# Patient Record
Sex: MALE | Race: WHITE | Employment: FULL TIME | URBAN - METROPOLITAN AREA
[De-identification: names, ages, dates, MRNs, and addresses within clinical notes are randomized per-mention and may not be internally consistent; named-entity substitution may affect disease eponyms.]

---

## 2017-01-18 DIAGNOSIS — M47.899 FACET SYNDROME: ICD-10-CM

## 2017-01-18 DIAGNOSIS — M79.18 MYOFASCIAL PAIN: ICD-10-CM

## 2017-01-18 RX ORDER — DICLOFENAC SODIUM 75 MG/1
TABLET, DELAYED RELEASE ORAL
Qty: 60 TAB | Refills: 0 | Status: SHIPPED | OUTPATIENT
Start: 2017-01-18 | End: 2018-10-20

## 2017-01-26 DIAGNOSIS — M47.899 FACET SYNDROME: ICD-10-CM

## 2017-01-26 DIAGNOSIS — M79.18 MYOFASCIAL PAIN: ICD-10-CM

## 2017-01-27 RX ORDER — TRAMADOL HYDROCHLORIDE 50 MG/1
TABLET ORAL
Qty: 90 TAB | Refills: 0 | Status: SHIPPED | OUTPATIENT
Start: 2017-01-27

## 2017-03-23 ENCOUNTER — HOSPITAL ENCOUNTER (OUTPATIENT)
Dept: GENERAL RADIOLOGY | Age: 57
Discharge: HOME | End: 2017-03-23
Payer: COMMERCIAL

## 2017-03-23 DIAGNOSIS — M25.512 LEFT SHOULDER PAIN: ICD-10-CM

## 2017-03-23 PROCEDURE — 73030 X-RAY EXAM OF SHOULDER: CPT

## 2017-07-06 RX ORDER — TRAMADOL HYDROCHLORIDE 50 MG/1
TABLET ORAL
Qty: 90 TAB | Refills: 0 | OUTPATIENT
Start: 2017-07-06

## 2018-01-31 ENCOUNTER — HOSPITAL ENCOUNTER (OUTPATIENT)
Dept: GENERAL RADIOLOGY | Age: 58
Discharge: HOME | End: 2018-01-31
Payer: COMMERCIAL

## 2018-01-31 DIAGNOSIS — J20.9 ACUTE BRONCHITIS: ICD-10-CM

## 2018-01-31 PROCEDURE — 71046 X-RAY EXAM CHEST 2 VIEWS: CPT

## 2018-10-20 ENCOUNTER — HOSPITAL ENCOUNTER (EMERGENCY)
Age: 58
Discharge: HOME | End: 2018-10-20
Attending: EMERGENCY MEDICINE
Payer: COMMERCIAL

## 2018-10-20 VITALS
WEIGHT: 230 LBS | HEART RATE: 90 BPM | HEIGHT: 75 IN | TEMPERATURE: 98.7 F | DIASTOLIC BLOOD PRESSURE: 80 MMHG | OXYGEN SATURATION: 100 % | BODY MASS INDEX: 28.6 KG/M2 | RESPIRATION RATE: 18 BRPM | SYSTOLIC BLOOD PRESSURE: 130 MMHG

## 2018-10-20 DIAGNOSIS — E86.0 DEHYDRATION: ICD-10-CM

## 2018-10-20 DIAGNOSIS — R19.7 DIARRHEA, UNSPECIFIED TYPE: ICD-10-CM

## 2018-10-20 DIAGNOSIS — R51.9 ACUTE NONINTRACTABLE HEADACHE, UNSPECIFIED HEADACHE TYPE: Primary | ICD-10-CM

## 2018-10-20 PROCEDURE — 96376 TX/PRO/DX INJ SAME DRUG ADON: CPT

## 2018-10-20 PROCEDURE — 96361 HYDRATE IV INFUSION ADD-ON: CPT

## 2018-10-20 PROCEDURE — 96375 TX/PRO/DX INJ NEW DRUG ADDON: CPT

## 2018-10-20 PROCEDURE — 99282 EMERGENCY DEPT VISIT SF MDM: CPT

## 2018-10-20 PROCEDURE — 96374 THER/PROPH/DIAG INJ IV PUSH: CPT

## 2018-10-20 PROCEDURE — 74011250636 HC RX REV CODE- 250/636: Performed by: EMERGENCY MEDICINE

## 2018-10-20 RX ORDER — DIPHENHYDRAMINE HYDROCHLORIDE 50 MG/ML
25 INJECTION, SOLUTION INTRAMUSCULAR; INTRAVENOUS ONCE
Status: COMPLETED | OUTPATIENT
Start: 2018-10-20 | End: 2018-10-20

## 2018-10-20 RX ORDER — METOCLOPRAMIDE HYDROCHLORIDE 5 MG/ML
10 INJECTION INTRAMUSCULAR; INTRAVENOUS ONCE
Status: COMPLETED | OUTPATIENT
Start: 2018-10-20 | End: 2018-10-20

## 2018-10-20 RX ORDER — DIPHENHYDRAMINE HYDROCHLORIDE 50 MG/ML
50 INJECTION, SOLUTION INTRAMUSCULAR; INTRAVENOUS
Status: COMPLETED | OUTPATIENT
Start: 2018-10-20 | End: 2018-10-20

## 2018-10-20 RX ADMIN — METOCLOPRAMIDE 10 MG: 5 INJECTION, SOLUTION INTRAMUSCULAR; INTRAVENOUS at 20:25

## 2018-10-20 RX ADMIN — DIPHENHYDRAMINE HYDROCHLORIDE 50 MG: 50 INJECTION INTRAMUSCULAR; INTRAVENOUS at 20:54

## 2018-10-20 RX ADMIN — SODIUM CHLORIDE 1000 ML: 900 INJECTION, SOLUTION INTRAVENOUS at 20:39

## 2018-10-20 RX ADMIN — DIPHENHYDRAMINE HYDROCHLORIDE 25 MG: 50 INJECTION INTRAMUSCULAR; INTRAVENOUS at 20:39

## 2018-10-21 NOTE — ED PROVIDER NOTES
EMERGENCY DEPARTMENT HISTORY AND PHYSICAL EXAM 
 
8:24 PM 
 
 
Date: 10/20/2018 Patient Name: Isaac Ruiz History of Presenting Illness Chief Complaint Patient presents with  Hypertension  Headache  Diarrhea History Provided By: Patient and Patient's Wife Chief Complaint:  Diarrhea Duration:  Days Timing:  Gradual 
Location: na 
Quality: na 
Severity: Moderate Modifying Factors: worse after food Associated Symptoms: nausea, headache, feels warm and having chills Additional History (Context): Isaac Ruiz is a 62 y.o. male with diabetes, hypertension and hyperlipidemia who presents with diarrhea and ha. PCP: Ady Roldan MD 
 
Current Outpatient Medications Medication Sig Dispense Refill  amlodipine besylate (AMLODIPINE PO) Take  by mouth.  tamsulosin (FLOMAX) 0.4 mg capsule Take 1 Cap by mouth daily (after dinner). 90 Cap 3  
 levoFLOXacin (LEVAQUIN) 750 mg tablet Take 1 Tab by mouth daily. 1 Tab 0  
 baclofen (LIORESAL) 10 mg tablet Take  by mouth three (3) times daily.  tadalafil (CIALIS) 5 mg tablet Take 5 mg by mouth.  gabapentin (NEURONTIN) 300 mg capsule Take 300 mg by mouth three (3) times daily.  hydroCHLOROthiazide (HYDRODIURIL) 25 mg tablet Take 25 mg by mouth daily.  HYDROcodone-acetaminophen (NORCO) 5-325 mg per tablet Take  by mouth.  metFORMIN (GLUCOPHAGE) 500 mg tablet Take  by mouth two (2) times daily (with meals).  predniSONE (DELTASONE) 10 mg tablet Take  by mouth daily (with breakfast).  simvastatin (ZOCOR) 10 mg tablet Take  by mouth nightly.  traMADol (ULTRAM) 50 mg tablet Take 50 mg by mouth every six (6) hours as needed for Pain.  traMADol (ULTRAM) 50 mg tablet TAKE ONE TABLET BY MOUTH THREE TIMES A DAY AS NEEDED FOR PAIN 90 Tab 0  
 aspirin 81 mg chewable tablet Take 81 mg by mouth daily.  baclofen (LIORESAL) 10 mg tablet   0  
 gabapentin (NEURONTIN) 300 mg capsule   1  hydrochlorothiazide (HYDRODIURIL) 25 mg tablet   2  
 simvastatin (ZOCOR) 10 mg tablet   1  
 metFORMIN (GLUCOPHAGE) 500 mg tablet   1 Past History Past Medical History: 
Past Medical History:  
Diagnosis Date  Chronic low back pain  Degeneration of lumbar intervertebral disc  Diabetes ([facility] [address]  Diabetes mellitus ([facility] [address]  Essential hypertension  Hyperlipidemia  Hypertension  Shoulder pain Past Surgical History: 
Past Surgical History:  
Procedure Laterality Date  HX COLONOSCOPY    
 HX HERNIA REPAIR    
 HX OTHER SURGICAL    
 knee surgery Family History: 
Family History Problem Relation Age of Onset  Hypertension Mother  Diabetes Father  Heart Failure Father  Hypertension Father Social History: 
Social History Tobacco Use  Smoking status: Never Smoker  Smokeless tobacco: Never Used Substance Use Topics  Alcohol use: No  
  Alcohol/week: 0.0 oz  Drug use: No  
 
 
Allergies: Allergies Allergen Reactions  Lisinopril Cough Review of Systems Review of Systems Constitutional: Positive for appetite change and chills. Negative for fever. Respiratory: Negative for chest tightness and shortness of breath. Cardiovascular: Negative for chest pain. Gastrointestinal: Positive for diarrhea and nausea. Negative for blood in stool and vomiting. All other systems reviewed and are negative. Physical Exam  
 
Visit Vitals BP (!) 133/91 Pulse (!) 108 Temp 98.7 °F (37.1 °C) Resp 18 Ht 6' 3\" (1.905 m) Wt 104.3 kg (230 lb) SpO2 95% BMI 28.75 kg/m² Physical Exam  
Constitutional: He is oriented to person, place, and time. He appears well-developed and well-nourished. No distress. HENT:  
Head: Normocephalic and atraumatic. Right Ear: Hearing and external ear normal. No tenderness. Left Ear: Hearing and external ear normal. No tenderness. Nose: Nose normal. No rhinorrhea. Mouth/Throat: Oropharynx is clear and moist and mucous membranes are normal. No oropharyngeal exudate. Nose: No redness. Eyes: Conjunctivae and EOM are normal. Pupils are equal, round, and reactive to light. Right eye exhibits no discharge. Left eye exhibits no discharge. Neck: Normal range of motion. Neck supple. No tracheal deviation present. No thyromegaly present. Cardiovascular: Regular rhythm, S1 normal, S2 normal, normal heart sounds and intact distal pulses. Tachycardia present. Exam reveals no gallop and no friction rub. No murmur heard. Pulses 2+ throughout. Pulmonary/Chest: Breath sounds normal. No respiratory distress. He has no decreased breath sounds. He has no wheezes. He has no rales. He exhibits no tenderness. Chest: Atraumatic Abdominal: Soft. Bowel sounds are normal. He exhibits no distension. There is no tenderness. No peritoneal signs. Musculoskeletal: Normal range of motion. He exhibits no edema, tenderness or deformity. Back: Normal ROM. No CVA tenderness with palpation. Neurological: He is alert and oriented to person, place, and time. He has normal strength and normal reflexes. No cranial nerve deficit. Gait normal.  
Reflexes 2+ at brachioradialis and patella, bilaterally. Skin: Skin is warm, dry and intact. No rash noted. No erythema. No pallor. No wounds. Psychiatric: He has a normal mood and affect. His speech is normal and behavior is normal. Thought content normal. He expresses no homicidal and no suicidal ideation. Nursing note and vitals reviewed. Diagnostic Study Results Labs - No results found for this or any previous visit (from the past 12 hour(s)). Radiologic Studies - No orders to display Medical Decision Making I am the first provider for this patient. I reviewed the vital signs, available nursing notes, past medical history, past surgical history, family history and social history. Vital Signs-Reviewed the patient's vital signs. Pulse Oximetry Analysis -  95  on room air (Interpretation) normal 
 
 
 
Records Reviewed: Nursing Notes and Old Medical Records (Time of Review: 8:24 PM) Reviewed patients old med list.  
 
ED Course: Progress Notes, Reevaluation, and Consults: 
 
63 y/o male with diarrhea and nausea since Thursday (3 days now) Dry heaving once but no vomiting Decreased appetite and decreased PO liquid intake Normal measured temp at home but felt warm and then had chills Gradual onset of headache Thursday morning on his way to work. Frontal, aching-pounding. No radiation Afebrile, neck supply, no trauma 
 
nonfocal neuro exam 
abd s/nt D/w pt and his wife, low yield for labs or imaging. Abdomen is completely benign - doubt appie or other life threat Considered SAH/ menignitis / SDH other serious etiology for HA, I think all are unlikely. He looks very well, I think he likely has a viral illness, causing nausea and diarrhea (d/w pt and wife that there may be an underlying more serious diagnosis which is not yet apparent) He has mild dehdration. Will tx HA, do fluid resus  And re=evaluate,. 
 
9:41 PM 
   HA has resolved,  HR 75 on recheck Feels better, ready for home BP ranging 150 to 190 at home, but here, he is 133/91 Doubt hypertensive crisis with that level. Continue home anti-htn reginem and followup with pcm 
 
preacutiosn given for return Diagnosis Clinical Impression: 1. Acute nonintractable headache, unspecified headache type 2. Dehydration 3. Diarrhea, unspecified type Disposition: home Follow-up Information Follow up With Specialties Details Why Contact Info Cat De Leon MD Family Practice   [address] [facility] [address] 
[phone] [address] [facility] EMERGENCY DEPT Emergency Medicine  As needed, If symptoms worsen [place] [id number] [phone] Medication List  
  
ASK your doctor about these medications AMLODIPINE PO 
  
aspirin 81 mg chewable tablet * baclofen 10 mg tablet Commonly known as:  LIORESAL 
  
* baclofen 10 mg tablet Commonly known as:  LIORESAL 
  
CIALIS 5 mg tablet Generic drug:  tadalafil * gabapentin 300 mg capsule Commonly known as:  NEURONTIN 
  
* gabapentin 300 mg capsule Commonly known as:  NEURONTIN 
  
* hydroCHLOROthiazide 25 mg tablet Commonly known as:  HYDRODIURIL * hydroCHLOROthiazide 25 mg tablet Commonly known as:  HYDRODIURIL HYDROcodone-acetaminophen 5-325 mg per tablet Commonly known as:  NORCO 
  
levoFLOXacin 750 mg tablet Commonly known as:  Sallye Buzzard Take 1 Tab by mouth daily. * metFORMIN 500 mg tablet Commonly known as:  GLUCOPHAGE 
  
* metFORMIN 500 mg tablet Commonly known as:  GLUCOPHAGE 
  
predniSONE 10 mg tablet Commonly known as:  DELTASONE 
  
* simvastatin 10 mg tablet Commonly known as:  ZOCOR * simvastatin 10 mg tablet Commonly known as:  ZOCOR 
  
tamsulosin 0.4 mg capsule Commonly known as:  FLOMAX Take 1 Cap by mouth daily (after dinner). * traMADol 50 mg tablet Commonly known as:  ULTRAM 
  
* traMADol 50 mg tablet Commonly known as:  ULTRAM 
TAKE ONE TABLET BY MOUTH THREE TIMES A DAY AS NEEDED FOR PAIN * This list has 12 medication(s) that are the same as other medications prescribed for you. Read the directions carefully, and ask your doctor or other care provider to review them with you.  
  
  
  
 
_______________________________

## 2018-10-21 NOTE — DISCHARGE INSTRUCTIONS
Diarrhea: Care Instructions  Your Care Instructions    Diarrhea is loose, watery stools (bowel movements). The exact cause is often hard to find. Sometimes diarrhea is your body's way of getting rid of what caused an upset stomach. Viruses, food poisoning, and many medicines can cause diarrhea. Some people get diarrhea in response to emotional stress, anxiety, or certain foods. Almost everyone has diarrhea now and then. It usually isn't serious, and your stools will return to normal soon. The important thing to do is replace the fluids you have lost, so you can prevent dehydration. The doctor has checked you carefully, but problems can develop later. If you notice any problems or new symptoms, get medical treatment right away. Follow-up care is a key part of your treatment and safety. Be sure to make and go to all appointments, and call your doctor if you are having problems. It's also a good idea to know your test results and keep a list of the medicines you take. How can you care for yourself at home? · Watch for signs of dehydration, which means your body has lost too much water. Dehydration is a serious condition and should be treated right away. Signs of dehydration are:  ? Increasing thirst and dry eyes and mouth. ? Feeling faint or lightheaded. ? Darker urine, and a smaller amount of urine than normal.  · To prevent dehydration, drink plenty of fluids, enough so that your urine is light yellow or clear like water. Choose water and other caffeine-free clear liquids until you feel better. If you have kidney, heart, or liver disease and have to limit fluids, talk with your doctor before you increase the amount of fluids you drink. · Begin eating small amounts of mild foods the next day, if you feel like it. ? Try yogurt that has live cultures of Lactobacillus. (Check the label.)  ? Avoid spicy foods, fruits, alcohol, and caffeine until 48 hours after all symptoms are gone. ?  Avoid chewing gum that contains sorbitol. ? Avoid dairy products (except for yogurt with Lactobacillus) while you have diarrhea and for 3 days after symptoms are gone. · The doctor may recommend that you take over-the-counter medicine, such as loperamide (Imodium), if you still have diarrhea after 6 hours. Read and follow all instructions on the label. Do not use this medicine if you have bloody diarrhea, a high fever, or other signs of serious illness. Call your doctor if you think you are having a problem with your medicine. When should you call for help? Call 911 anytime you think you may need emergency care. For example, call if:    · You passed out (lost consciousness).     · Your stools are maroon or very bloody.    Call your doctor now or seek immediate medical care if:    · You are dizzy or lightheaded, or you feel like you may faint.     · Your stools are black and look like tar, or they have streaks of blood.     · You have new or worse belly pain.     · You have symptoms of dehydration, such as:  ? Dry eyes and a dry mouth. ? Passing only a little dark urine. ? Feeling thirstier than usual.     · You have a new or higher fever.    Watch closely for changes in your health, and be sure to contact your doctor if:    · Your diarrhea is getting worse.     · You see pus in the diarrhea.     · You are not getting better after 2 days (48 hours). Where can you learn more? Go to http://aguila-elana.info/. Enter L817 in the search box to learn more about \"Diarrhea: Care Instructions. \"  Current as of: November 20, 2017  Content Version: 11.8  © 2214-7286 [employer]. Care instructions adapted under license by [employer] (which disclaims liability or warranty for this information).  If you have questions about a medical condition or this instruction, always ask your healthcare professional. [place] [address] any warranty or liability for your use of this information.

## 2018-10-21 NOTE — ED NOTES
Tereso Carranza is a 62 y.o. male that was discharged in stable. Pt was accompanied by spouse. Pt is not driving. The patients diagnosis, condition and treatment were explained to  patient and aftercare instructions were given. The patient verbalized understanding. Patient armband removed and shredded.

## 2018-12-14 ENCOUNTER — HOSPITAL ENCOUNTER (OUTPATIENT)
Dept: GENERAL RADIOLOGY | Age: 58
Discharge: HOME | End: 2018-12-14
Payer: COMMERCIAL

## 2018-12-14 DIAGNOSIS — M25.561 PAIN IN RIGHT KNEE: ICD-10-CM

## 2018-12-14 PROCEDURE — 73564 X-RAY EXAM KNEE 4 OR MORE: CPT

## 2019-08-16 ENCOUNTER — HOSPITAL ENCOUNTER (OUTPATIENT)
Dept: GENERAL RADIOLOGY | Age: 59
Discharge: HOME | End: 2019-08-16
Payer: COMMERCIAL

## 2019-08-16 DIAGNOSIS — M54.2 CERVICALGIA: ICD-10-CM

## 2019-08-16 PROCEDURE — 72050 X-RAY EXAM NECK SPINE 4/5VWS: CPT

## 2019-08-30 ENCOUNTER — HOSPITAL ENCOUNTER (OUTPATIENT)
Dept: PHYSICAL THERAPY | Age: 59
Discharge: HOME | End: 2019-08-30
Payer: COMMERCIAL

## 2019-08-30 PROCEDURE — 97012 MECHANICAL TRACTION THERAPY: CPT

## 2019-08-30 PROCEDURE — 97161 PT EVAL LOW COMPLEX 20 MIN: CPT

## 2019-08-30 PROCEDURE — 97110 THERAPEUTIC EXERCISES: CPT

## 2019-08-30 NOTE — PROGRESS NOTES
[facility] [address]  [place], [address]  [phone] fax    Plan of Care/ Statement of Necessity for Physical Therapy Services    Patient name: Marcell Mallory Start of Care: 2019   Referral source: Libia Sun MD : 1960    Medical Diagnosis: Cervicalgia [M54.2]  Payor: BLUE CROSS / Plan: [address] [place] [address] / Product Type: PPO /  Onset Date:4 months    Treatment Diagnosis: c/s pain   Prior Hospitalization: see medical history Provider#: [id number]   Medications: Verified on Patient summary List    Comorbidities: diabetes, OA, HTN   Prior Level of Function: functionally I with daily tasks     The Plan of Care and following information is based on the information from the initial evaluation. Assessment/ liu information: 61 y/o male presents with c/o neck pain, primarily on the left and pain and numbness into the left shoulder. He also reports daily headaches. He reports no mechanism of injury. Pt demonstrates diminished posture with forward head and shoulders and increased t/s kyphosis. He demonstrates significantly limited c/s and t/s mobility and myofascial restrictions throughout the c/s musculature, including the suboccipitals. . Decreased strength noted mid and lower trap and shoulder external rotators. Significant TTP noted in the left UT, levator, c/s paraspinals, SCM, scalenes. Pt will benefit from PT to address the aforementioned impairments. Evaluation Complexity History MEDIUM  Complexity : 1-2 comorbidities / personal factors will impact the outcome/ POC ; Examination LOW Complexity : 1-2 Standardized tests and measures addressing body structure, function, activity limitation and / or participation in recreation  ;Presentation MEDIUM Complexity : Evolving with changing characteristics  ; Clinical Decision Making MEDIUM Complexity : FOTO score of 26-74  Overall Complexity Rating: LOW   Problem List: pain affecting function, decrease ROM, decrease strength, decrease ADL/ functional abilitiies, decrease activity tolerance and decrease flexibility/ joint mobility   Treatment Plan may include any combination of the following: Therapeutic exercise, Therapeutic activities, Neuromuscular re-education, Physical agent/modality, Manual therapy, Patient education and Self Care training  Patient / Family readiness to learn indicated by: asking questions, trying to perform skills and interest  Persons(s) to be included in education: patient (P)  Barriers to Learning/Limitations: None  Patient Goal (s): To help manage my pain to work  Patient Self Reported Health Status: good  Rehabilitation Potential: good    Short Term Goals: To be accomplished in 1 weeks:   1. Pt will be I and compliant with HEP     Long Term Goals: To be accomplished in 4 weeks:   1. Improve FOTO score to predicted outcome to improve ability for functional tasks   2. Improve c/s AROM rotation to 40 degrees to improve ability for driving and other daily activities. 3. Pt will report at least 50% reduction in headaches to improve ability for job activitese   4. Pt will demonstrate 4+/5 mid trap strength to improve ability for lifting at work. Frequency / Duration: Patient to be seen 2 times per week for 4-6 weeks. Patient/ Caregiver education and instruction: Diagnosis, prognosis, self care, activity modification and exercises   [x]  Plan of care has been reviewed with LILIYA Lazaro, PT 8/30/2019 9:29 AM    ________________________________________________________________________    I certify that the above Therapy Services are being furnished while the patient is under my care. I agree with the treatment plan and certify that this therapy is necessary.     [de-identified] Signature:____________Date:_________TIME:________    [employer], Date and Time must be completed for valid certification **    Please sign and return to [facility]  [address]  [place], [address]  [phone] fax

## 2019-08-30 NOTE — PROGRESS NOTES
Request for use of Dry Needling/Intramuscular Manual Therapy  Patient: Freddie Mckeon     Referral Source: Raul Wade MD  Diagnosis: Cervicalgia [M54.2]      : 1960  Date of initial visit: 19   Attended visits: 1  Missed Visits: 0    Based on findings from the physical therapy examination and evaluation, the evaluating therapist believes the patient, Freddie Mckeon  would benefit from including Dry Needling as part of the plan of care. Dry needling is a treatment technique utilized in conjunction with other PT interventions to inactivate myofascial trigger points and the pain and dysfunction they cause. Dry Needling is an advanced procedure that requires additional training including greater than 54 hours of intensive course work. Physical Therapists at [address] [place] [address] are trained and/or certified through [employer] for their education. PROCEDURE:   Solid filament sterile needle (typically 0.3mm/30 gauge) inserted into a trigger point   Repeated movements inactivate the trigger points, taking 30-60 seconds per site   Typically consists of 1 dry needling session per week and a possible second treatment including muscle re-education, flexibility, strengthening and other manual techniques to facilitate the benefits of dry needling     BENEFITS:   Inactivation of trigger points   Decreased pain   Increased muscle length   Improved movement patterns   Restoration of function POTENTIAL RISKS:   Post-needling soreness   Infection   Bruising/bleeding   Penetration of a nerve   Pneumothorax   All treating PTs have been thoroughly educated in avoiding adverse reactions    If you agree with this recommendation, please sign this form and fax it to us at [phone]. If you have questions or concerns regarding dry needling or any other treatment we may be providing, please contact us at [phone].     Thank you for allowing us to assist in the care of your patient. Demetria Piña, PT    8/30/2019 10:35 AM     NOTE TO PHYSICIAN:  PLEASE COMPLETE THE ORDERS BELOW AND   FAX TO [facility]: [phone]  If you are unable to process this request in 24 hours please contact our office:   [phone]    I have read the above request and AGREE to the recommendation of including dry needling as part of the plan of care.       Physicians signature: _________________________Date: _________Time:________

## 2019-08-30 NOTE — PROGRESS NOTES
PT DAILY TREATMENT NOTE 10-18    Patient Name: Ole Ling  Date:2019  : 1960  [x]  Patient  Verified  Payor: Serina Chung / Plan: [address] [place] [address] / Product Type: PPO /    In time:930  Out time:  Total Treatment Time (min): 45  Visit #: 1 of 8    Medicare/BCBS Only   Total Timed Codes (min):  10 1:1 Treatment Time:  45       Treatment Area: Cervicalgia [M54.2]    SUBJECTIVE  Pain Level (0-10 scale): 8  Any medication changes, allergies to medications, adverse drug reactions, diagnosis change, or new procedure performed?: [x] No    [] Yes (see summary sheet for update)  Subjective functional status/changes:   [] No changes reported       OBJECTIVE    Modality rationale: decrease pain and increase tissue extensibility to improve the patients ability to perform ADL   Min Type Additional Details    [] Estim:  []Unatt       []IFC  []Premod                        []Other:  []w/ice   []w/heat  Position:  Location:    [] Estim: []Att    []TENS instruct  []NMES                    []Other:  []w/US   []w/ice   []w/heat  Position:  Location:   10 [x]  Traction: [x] Cervical       []Lumbar                       [] Prone          [x]Supine                       []Intermittent   [x]Continuous Lbs: 23  [] before manual  [] after manual    []  Ultrasound: []Continuous   [] Pulsed                           []1MHz   []3MHz W/cm2:  Location:    []  Iontophoresis with dexamethasone         Location: [] Take home patch   [] In clinic    []  Ice     []  heat  []  Ice massage  []  Laser   []  Anodyne Position:  Location:    []  Laser with stim  []  Other:  Position:  Location:    []  Vasopneumatic Device Pressure:       [] lo [] med [] hi   Temperature: [] lo [] med [] hi   [] Skin assessment post-treatment:  []intact []redness- no adverse reaction    []redness - adverse reaction:     27 min [x]Eval                  []Re-Eval       8 min Therapeutic Exercise:  [] See flow sheet : HEP   Rationale: increase ROM and increase strength to improve the patients ability to perform ADL            With   [] TE   [] TA   [] neuro   [] other: Patient Education: [x] Review HEP    [] Progressed/Changed HEP based on:   [] positioning   [] body mechanics   [] transfers   [] heat/ice application    [] other:      Other Objective/Functional Measures:       Pain Level (0-10 scale) post treatment: 8    ASSESSMENT/Changes in Function:      Patient will continue to benefit from skilled PT services to modify and progress therapeutic interventions, address functional mobility deficits, address ROM deficits, address strength deficits, analyze and address soft tissue restrictions, analyze and cue movement patterns, analyze and modify body mechanics/ergonomics and assess and modify postural abnormalities to attain remaining goals.      []  See Plan of Care  []  See progress note/recertification  []  See Discharge Summary         Progress towards goals / Updated goals:  Per [address]  []  Upgrade activities as tolerated     [x]  Continue plan of care  []  Update interventions per flow sheet       []  Discharge due to:_  []  Other:_      Saurabh Jordan PT 8/30/2019  9:25 AM    Future Appointments   Date Time Provider Sathya Platt   8/30/2019  9:30 AM Jaswant Laureano, PT [id number]

## 2019-09-12 ENCOUNTER — HOSPITAL ENCOUNTER (OUTPATIENT)
Dept: PHYSICAL THERAPY | Age: 59
Discharge: HOME | End: 2019-09-12
Payer: COMMERCIAL

## 2019-09-12 PROCEDURE — 97012 MECHANICAL TRACTION THERAPY: CPT

## 2019-09-12 PROCEDURE — 97110 THERAPEUTIC EXERCISES: CPT

## 2019-09-12 PROCEDURE — 97140 MANUAL THERAPY 1/> REGIONS: CPT

## 2019-09-12 NOTE — PROGRESS NOTES
PT DAILY TREATMENT NOTE 10-18    Patient Name: Robi Ziegler  Date:2019  : 1960  [x]  Patient  Verified  Payor: BLUE CROSS / Plan: [address] [place] [address] / Product Type: PPO /    In time:731  Out time:816  Total Treatment Time (min): 45  Visit #: 2 of 8    Medicare/BCBS Only   Total Timed Codes (min):  45 1:1 Treatment Time:  40       Treatment Area: Cervicalgia [M54.2]    SUBJECTIVE  Pain Level (0-10 scale): 6  Any medication changes, allergies to medications, adverse drug reactions, diagnosis change, or new procedure performed?: [x] No    [] Yes (see summary sheet for update)  Subjective functional status/changes:   [] No changes reported  Patient reported decreased     OBJECTIVE    Modality rationale: decrease pain and increase tissue extensibility to improve the patients ability to perform ADLs   Min Type Additional Details    [] Estim:  []Unatt       []IFC  []Premod                        []Other:  []w/ice   []w/heat  Position:  Location:    [] Estim: []Att    []TENS instruct  []NMES                    []Other:  []w/US   []w/ice   []w/heat  Position:  Location:   10 [x]  Traction: [x] Cervical       []Lumbar                       [] Prone          [x]Supine                       []Intermittent   []Continuous Lbs:23  [] before manual  [x] after manual    []  Ultrasound: []Continuous   [] Pulsed                           []1MHz   []3MHz W/cm2:  Location:    []  Iontophoresis with dexamethasone         Location: [] Take home patch   [] In clinic    []  Ice     []  heat  []  Ice massage  []  Laser   []  Anodyne Position:  Location:    []  Laser with stim  []  Other:  Position:  Location:    []  Vasopneumatic Device Pressure:       [] lo [] med [] hi   Temperature: [] lo [] med [] hi   [] Skin assessment post-treatment:  []intact []redness- no adverse reaction    []redness - adverse reaction:     27 min Therapeutic Exercise:  [] See flow sheet :   Rationale: increase ROM, increase strength and improve coordination to improve the patients ability to perform ADLs             8 min Manual Therapy:  Per flow sheet   Rationale: decrease pain, increase ROM and increase tissue extensibility to peform ADLs              With   [] TE   [] TA   [] neuro   [] other: Patient Education: [x] Review HEP    [] Progressed/Changed HEP based on:   [] positioning   [] body mechanics   [] transfers   [] heat/ice application    [] other:      Other Objective/Functional Measures: initiated treatment     Pain Level (0-10 scale) post treatment: 5    ASSESSMENT/Changes in Function: patient tolerated initiation of treatment per flow sheet an dPOC well without c/o increased pain. Patient will continue to benefit from skilled PT services to modify and progress therapeutic interventions, address functional mobility deficits, address ROM deficits, address strength deficits, analyze and address soft tissue restrictions and analyze and cue movement patterns to attain remaining goals. [x]  See Plan of Care  []  See progress note/recertification  []  See Discharge Summary         Progress towards goals / Updated goals:  Short Term Goals: To be accomplished in 1 weeks:               1. Pt will be I and compliant with HEP met patient reports compliance (9/12/19)     Long Term Goals: To be accomplished in 4 weeks:               1. Improve FOTO score to predicted outcome to improve ability for functional tasks               2. Improve c/s AROM rotation to 40 degrees to improve ability for driving and other daily activities. 3. Pt will report at least 50% reduction in headaches to improve ability for job activitese               4. Pt will demonstrate 4+/5 mid trap strength to improve ability for lifting at work.      PLAN  []  Upgrade activities as tolerated     [x]  Continue plan of care  []  Update interventions per flow sheet       []  Discharge due to:_  []  Other:_      Tiana Nicole, PTA 9/12/2019  7:53 AM    Future Appointments   Date Time Provider Sathya Giulia   9/13/2019 10:30 AM Priyank Wallace, PT [facility]   9/20/2019 10:00 AM Shahram Ozuna PTA [facility]

## 2019-09-13 ENCOUNTER — HOSPITAL ENCOUNTER (OUTPATIENT)
Dept: PHYSICAL THERAPY | Age: 59
Discharge: HOME | End: 2019-09-13
Payer: COMMERCIAL

## 2019-09-13 PROCEDURE — 97112 NEUROMUSCULAR REEDUCATION: CPT

## 2019-09-13 PROCEDURE — 97110 THERAPEUTIC EXERCISES: CPT

## 2019-09-13 NOTE — PROGRESS NOTES
PT DAILY TREATMENT NOTE 10-18    Patient Name: Chance Garcia  Date:2019  : 1960  [x]  Patient  Verified  Payor: [employer] / Plan: [address] [place] [address] / Product Type: PPO /    In time:1030  Out time:1118  Total Treatment Time (min): 48  Visit #: 3 of 8    Medicare/BCBS Only   Total Timed Codes (min):  38 1:1 Treatment Time:  38       Treatment Area: Cervicalgia [M54.2]    SUBJECTIVE  Pain Level (0-10 scale): 7 with meds  Any medication changes, allergies to medications, adverse drug reactions, diagnosis change, or new procedure performed?: [x] No    [] Yes (see summary sheet for update)  Subjective functional status/changes:   [x] No changes reported      OBJECTIVE    Modality rationale: decrease pain to improve the patients ability to tolerate post needle soreness   Min Type Additional Details    [] Estim:  []Unatt       []IFC  []Premod                        []Other:  []w/ice   []w/heat  Position:  Location:    [] Estim: []Att    []TENS instruct  []NMES                    []Other:  []w/US   []w/ice   []w/heat  Position:  Location:    []  Traction: [] Cervical       []Lumbar                       [] Prone          []Supine                       []Intermittent   []Continuous Lbs:  [] before manual  [] after manual    []  Ultrasound: []Continuous   [] Pulsed                           []1MHz   []3MHz W/cm2:  Location:    []  Iontophoresis with dexamethasone         Location: [] Take home patch   [] In clinic   10 [x]  Ice     []  heat  []  Ice massage  []  Laser   []  Anodyne Position:  Location:    []  Laser with stim  []  Other:  Position:  Location:    []  Vasopneumatic Device Pressure:       [] lo [] med [] hi   Temperature: [] lo [] med [] hi   [] Skin assessment post-treatment:  []intact []redness- no adverse reaction      23 min Therapeutic Exercise:  [] See flow sheet :   Rationale: increase ROM and increase strength to improve the patients ability to perform daily activities     15 min Neuromuscular Re-education:  []  See flow sheet :   Rationale: increase ROM and increase strength  to improve the patients ability to perform work tasks  Dry Needling Procedure Note    Procedure: An intramuscular manual therapy (dry needling) and a neuro-muscular re-education treatment was done to deactivate myofascial trigger points with a 30 gauge filament needle under aseptic technique. Indications:  [x] Myofascial pain and dysfunction [] Muscled spasms  [x] Myalgia/myositis   [] Muscle cramps  [x] Muscle imbalances  [] Temporomandibular Dysfunction  [] Other:    Chart reviewed for the following:  Jason MENJIVAR PT, have reviewed the medical history, summary list and precautions/contraindications for [employer].   TIME OUT performed immediately prior to start of procedure:  Jason MENJIVAR PT, have performed the following reviews on [employer] prior to the start of the session:      [x] Verified patient identification by name and date of birth    [x] Agreement on all muscles being treated was verified   [x] Purpose of dry needling, side effects, possible complications, risks and benefits were explained to the patient   [x] Procedure site(s) verified  [x] Patient was positioned for comfort and draped for privacy  [x] Informed Consent was signed (initial visit) and verified verbally (subsequent visits)  [x] Patient was instructed on the need to report the use of blood thinners and/or immunosuppressant medications  [x] How to respond to possible adverse effects of treatment  [x] Self treatment of post needling soreness: ice, heat (moist heat, heat wraps) and stretching  [x] Opportunity was given to ask any questions, all questions were answered            Time: 935  Date of procedure: 9/13/2019  Treatment: The following muscles were treated today with intramuscular dry needling  [] Left [] Right Masster  [] Left [] Right Temporalis  [] Left [] Right Zygomaticus Major / Minor  [] Left [] Right Lateral Pterygoid  [] Left [] Right Medial Pterygoid  [] Left [] Right Digastric Post / Anterior Belly  [] Left [] Right Sternocleidomastoid  [] Left [] Right Scalene Anterior / Medial / Posterior  [] Left [] Right Extra Laryngeal Muscles  [x] Left [] Right Upper Trapezius  [] Left [] Right Middle Trapezius  [] Left [] Right Lower Trapezius  [] Left [] Right Oblique Capitis Inferior  [x] Left [] Right Splenius Capitis / Cervicis  [x] Left [] Right Semispinalis: Capitis / Cervicis  [x] Left [] Right Multifidi / Rotatores Cervicis / Thoracic  [] Left [] Right Longissimus Thoracis / Illiocostalis  [] Left [] Right Levator Scapulae  [] Left [] Right Supraspinatus / Infraspinatus  [] Left [] Right Teres Major / Minor  [] Left [] Right Rhomboids / Serratus posterior superior  [] Left [] Right Pectoralis Major / Minor  [] Left [] Right Serratus Anterior  [] Left [] Right Latissimus Dorsi  [] Left [] Right Subscapularis  [] Left [] Right Coracobrachialis  [] Left [] Right Biceps Brachii  [] Left [] Right Deltoid: Anterior / Medial / Posterior  [] Left [] Right Brachialis  [] Left [] Right Triceps  [] Left [] Right Brachioradialis  [] Left [] Right Extensor Carpi Radialis Brevis / Extensor Carpi Radialis Longus    [] Left [] Right  Extensor digitorum  [] Left [] Right Supinator / Pronator Teres  [] Left [] Right Flexor Carpi Radialis/ Flexor Carpi Ulnaris   [] Left [] Right  Flexor Digitorum Superficialis/ Flexor Digitorum Profundus  [] Left [] Right Flexor Pollicis Longus / Flexor Pollicis Brevis / Palmaris Longus  [] Left [] Right Abductor Pollicis Longus / Abductor Pollicis Brevis  [] Left [] Right Opponens Pollicis / Adductor Pollicis  [] Left [] Right Dorsal / Palmar Interossei / Lumbricalis  [] Left [] Right Abductor Digiti Minimi / Opponens Digiti Minimi    Patient's response to today's treatment:  [x] Latent Twitch Response     [x] Muscle relaxation [x] Pain Relief  [x] Post needling soreness    [x] without complications  [x] Increased Range of Motion   [] Decreased headaches    [] Decreased Tinnitus  [] Other:     Performed by: Neeru Espinal PT            With   [] TE   [] TA   [] neuro   [] other: Patient Education: [x] Review HEP    [] Progressed/Changed HEP based on:   [] positioning   [] body mechanics   [] transfers   [] heat/ice application    [] other:      Other Objective/Functional Measures: initiated DN     Pain Level (0-10 scale) post treatment: 8    ASSESSMENT/Changes in Function: Good tolerance to DN; poor left rotation noted in both C/S and T/S. Patient will continue to benefit from skilled PT services to modify and progress therapeutic interventions, address functional mobility deficits, address ROM deficits, address strength deficits, analyze and address soft tissue restrictions, analyze and cue movement patterns and assess and modify postural abnormalities to attain remaining goals. []  See Plan of Care  []  See progress note/recertification  []  See Discharge Summary         Progress towards goals / Updated goals:  Short Term Goals: To be accomplished in 1 weeks:               1. Pt will be I and compliant with HEP met patient reports compliance (9/12/19)     Long Term Goals: To be accomplished in 4 weeks:               1. Improve FOTO score to predicted outcome to improve ability for functional tasks               2. Improve c/s AROM rotation to 40 degrees to improve ability for driving and other daily activities.              3. Pt will report at least 50% reduction in headaches to improve ability for job activitese               4. Pt will demonstrate 4+/5 mid trap strength to improve ability for lifting at work.      PLAN  []  Upgrade activities as tolerated     []  Continue plan of care  []  Update interventions per flow sheet       []  Discharge due to:_  []  Other:_      Neeru Espinal, PT 9/13/2019  10:38 AM    Future Appointments   Date Time Provider Sathya Platt   9/20/2019 10:00 AM Mu Jenkins PTA [facility]

## 2019-09-20 ENCOUNTER — HOSPITAL ENCOUNTER (OUTPATIENT)
Dept: PHYSICAL THERAPY | Age: 59
Discharge: HOME | End: 2019-09-20
Payer: COMMERCIAL

## 2019-09-20 PROCEDURE — 97110 THERAPEUTIC EXERCISES: CPT

## 2019-09-20 PROCEDURE — 97140 MANUAL THERAPY 1/> REGIONS: CPT

## 2019-09-20 NOTE — PROGRESS NOTES
PT DAILY TREATMENT NOTE 10-18    Patient Name: Geoff Rodriguez  Date:2019  : 1960  [x]  Patient  Verified  Payor: Derik Catalan / Plan: [address] [place] [address] / Product Type: PPO /    In time:10:00  Out time:10:43  Total Treatment Time (min): 43  Visit #: 4 of 8    Medicare/BCBS Only   Total Timed Codes (min):  43 1:1 Treatment Time:  28       Treatment Area: Cervicalgia [M54.2]    SUBJECTIVE  Pain Level (0-10 scale): 8/10  Any medication changes, allergies to medications, adverse drug reactions, diagnosis change, or new procedure performed?: [x] No    [] Yes (see summary sheet for update)  Subjective functional status/changes:   [] No changes reported  \"I was dry-needled last visit, it's been sore since. \"     OBJECTIVE    35 min Therapeutic Exercise:  [x] See flow sheet :   Rationale: increase ROM, increase strength and increase proprioception to improve the patients ability to perform ADL's.    8 min Manual Therapy:  SOR. STM/DTM Left UT, lev scap, C/S paraspinals. C/S PROM Rotation. Rationale: decrease pain, increase ROM, increase tissue extensibility and decrease trigger points to improve ease of performing ADL's. With   [x] TE   [] TA   [] neuro   [] other: Patient Education: [x] Review HEP    [] Progressed/Changed HEP based on:   [] positioning   [] body mechanics   [] transfers   [] heat/ice application    [] other:      Other Objective/Functional Measures: Intake FOTO 51%. AROM C/S Rotation Left 29 degrees and Right 52 degrees. No change in HA's at this time per pt. Pt reports \"flush\" feeling when receiving SOR, quickly diminishes after releasing SOR hold. No significant change in symptoms at this time. Recommend F/U with MD and continue PT to decrease mm restrictions and nerve impingement to improve ease of seeing blind spots while driving.     Pain Level (0-10 scale) post treatment: 8/10    ASSESSMENT/Changes in Function:     []  See Plan of Care  [x]  See progress note/recertification  []  See Discharge Summary         Progress towards goals / Updated goals:  Short Term Goals: To be accomplished in 1 weeks:               1. Pt will be I and compliant with HEP met patient reports compliance (9/12/19)     Long Term Goals: To be accomplished in 4 weeks:               1. Improve FOTO score to predicted outcome to improve ability for functional tasks - Finished intake survey. 9/20/2019               2. Improve c/s AROM rotation to 40 degrees to improve ability for driving and other daily activities. - AROM C/S Rotation Left 29 degrees and Right 52 degrees. 9/20/2019               3. Pt will report at least 50% reduction in headaches to improve ability for job activitese - No change in HA's at this time per pt. 9/20/2019               4. Pt will demonstrate 4+/5 mid trap strength to improve ability for lifting at work. - Was not reassessed. 9/20/2019    PLAN  []  Upgrade activities as tolerated     [x]  Continue plan of care  []  Update interventions per flow sheet       []  Discharge due to:_  [x]  Other:_   Recommend F/U with MD, no significant change in symptoms.     Wei Rubin PTA 9/20/2019  9:59 AM    Future Appointments   Date Time Provider Sathya Platt   9/20/2019 10:00 AM Shahram Ozuna PTA [facility]

## 2019-09-20 NOTE — PROGRESS NOTES
[facility]  [address]  [place], [address]  [phone] fax    Physical Therapy Progress Note  Patient name: Amadou Moreland Start of Care: 2019   Referral source: Jero Richard MD : 1960   Medical/Treatment Diagnosis: Cervicalgia [M54.2]  Payor: BLUE CROSS / Plan: [address] [place] [address] / Product Type: PPO /  Onset Date:4 Months     Prior Hospitalization: see medical history Provider#: [id number]   Medications: Verified on Patient Summary List    Comorbidities: diabetes, OA, HTN  Prior Level of Function:functionally I with daily tasks  Visits from Start of Care: 4    Missed Visits: 0      Key Functional Changes: Intake FOTO 51%. AROM C/S Rotation Left 29 degrees and Right 52 degrees. No change in HA's at this time per pt. Pt reports \"flush\" feeling when receiving SOR, quickly diminishes after releasing SOR hold. Short Term Goals: To be accomplished in 1 weeks:               1. Pt will be I and compliant with HEP met patient reports compliance (19)     Long Term Goals: To be accomplished in 4 weeks:               1. Improve FOTO score to predicted outcome to improve ability for functional tasks - Finished intake survey. 2019               2. Improve c/s AROM rotation to 40 degrees to improve ability for driving and other daily activities. - AROM C/S Rotation Left 29 degrees and Right 52 degrees. 2019               3. Pt will report at least 50% reduction in headaches to improve ability for job activitese - No change in HA's at this time per pt. 2019               4. Pt will demonstrate 4+/5 mid trap strength to improve ability for lifting at work. - Was not reassessed. 2019        Updated Goals: to be achieved in 4 weeks:   [address] be accomplished in 4 weeks:               1. Improve FOTO score to predicted outcome to improve ability for functional tasks - Finished intake survey. 9/20/2019               2. Improve c/s AROM rotation to 40 degrees to improve ability for driving and other daily activities. - AROM C/S Rotation Left 29 degrees and Right 52 degrees. 9/20/2019               3. Pt will report at least 50% reduction in headaches to improve ability for job activitese - No change in HA's at this time per pt. 9/20/2019               4. Pt will demonstrate 4+/5 mid trap strength to improve ability for lifting at work. - Was not reassessed. 9/20/2019     No significant change in symptoms at this time. Recommend F/U with MD and continue PT to decrease mm restrictions and nerve impingement to improve ease of seeing blind spots while driving. ASSESSMENT/RECOMMENDATIONS:  [x]Continue therapy per initial plan/protocol at a frequency of  2 x per week for 4 weeks  []Continue therapy with the following recommended changes:_____________________      _____________________________________________________________________  []Discontinue therapy progressing towards or have reached established goals  []Discontinue therapy due to lack of appreciable progress towards goals  []Discontinue therapy due to lack of attendance or compliance  []Await Physician's recommendations/decisions regarding therapy  [x]Other:_Instructed pt to schedule F/U visit with MD._    Thank you for this referral.    Aime Campos, LILIYA 9/20/2019 12:43 PM  NOTE TO PHYSICIAN:  [address]   FAX TO [facility]: [phone]  If you are unable to process this request in 24 hours please contact our office: [phone] I have read the above report and request that my patient continue as recommended. ? I have read the above report and request that my patient continue therapy with the following changes/special instructions:__________________________________________________________  ? I have read the above report and request that my patient be discharged from therapy.     Roldan Roper signature: ______________________________Date: ______Time:______

## 2019-10-10 ENCOUNTER — HOSPITAL ENCOUNTER (OUTPATIENT)
Dept: PHYSICAL THERAPY | Age: 59
Discharge: HOME | End: 2019-10-10
Payer: COMMERCIAL

## 2019-10-10 PROCEDURE — 97140 MANUAL THERAPY 1/> REGIONS: CPT

## 2019-10-10 PROCEDURE — 97110 THERAPEUTIC EXERCISES: CPT

## 2019-10-10 NOTE — PROGRESS NOTES
PT DAILY TREATMENT NOTE 10-18    Patient Name: Beatris Romo  Date:10/10/2019  : 1960  [x]  Patient  Verified  Payor: Mon[employer]ica Fonseca / Plan: [address] [place] [address] / Product Type: PPO /    In time:8:30  Out time:9:08  Total Treatment Time (min): 38  Visit #: 1 of 8    Medicare/BCBS Only   Total Timed Codes (min):  38 1:1 Treatment Time:  30       Treatment Area: Cervicalgia [M54.2]    SUBJECTIVE  Pain Level (0-10 scale): 6  Any medication changes, allergies to medications, adverse drug reactions, diagnosis change, or new procedure performed?: [x] No    [] Yes (see summary sheet for update)  Subjective functional status/changes:   [] No changes reported  Pt reports no change since his last visit here, reports symptoms are at same intensity/frequency no worse or better. He reports he is having MRI tomorrow    OBJECTIVE    28 min Therapeutic Exercise:  [] See flow sheet :   Rationale: increase ROM and increase strength to improve the patients ability to perform daily tasks and work duties      10 min Manual Therapy:  SOR, cervical segmental mobility assessment and mobs in supine, t/s PA's and rib springs grade II-III in prone   Rationale: decrease pain, increase ROM and increase tissue extensibility to improve ease of ADL performance       With   [] TE   [] TA   [] neuro   [] other: Patient Education: [x] Review HEP    [] Progressed/Changed HEP based on:   [] positioning   [] body mechanics   [] transfers   [] heat/ice application    [] other:      Other Objective/Functional Measures: Pt still demonstrating increased left sided cervical tone and TTP with limited mobility      Pain Level (0-10 scale) post treatment: 6    ASSESSMENT/Changes in Function: Pt still reporting daily headaches and intermittent paresthesias through proximal left shoulder. Cervical stability is still quite limited. Pt is scheduled for MRI tomorrow. Will continue PT focusing on improved mobility and pain as able.     Patient will continue to benefit from skilled PT services to modify and progress therapeutic interventions, address functional mobility deficits, address ROM deficits, address strength deficits, analyze and address soft tissue restrictions, analyze and cue movement patterns and analyze and modify body mechanics/ergonomics to attain remaining goals. []  See Plan of Care  []  See progress note/recertification  []  See Discharge Summary         Progress towards goals / Updated goals:  Updated Goals: to be achieved in 4 weeks:              Long Term Goals: To be accomplished in 4 weeks:               1. Improve FOTO score to predicted outcome to improve ability for functional tasks - Finished intake survey. 9/20/2019               2. Improve c/s AROM rotation to 40 degrees to improve ability for driving and other daily activities. - AROM C/S Rotation Left 29 degrees and Right 52 degrees.  9/20/2019 slow progress- 33 deg left 52 deg right 10/10/19               3. Pt will report at least 50% reduction in headaches to improve ability for job activitese - No change in HA's at this time per pt. 9/20/2019; no change in headaches per pt report 10/10/19               4. Pt will demonstrate 4+/5 mid trap strength to improve ability for lifting at work. - Was not reassessed. 9/20/2019    PLAN  []  Upgrade activities as tolerated     [x]  Continue plan of care  []  Update interventions per flow sheet       []  Discharge due to:_  []  Other:_      May Hargrove DPT CMTPT 10/10/2019  8:39 AM    No future appointments.

## 2019-10-24 ENCOUNTER — HOSPITAL ENCOUNTER (OUTPATIENT)
Dept: PHYSICAL THERAPY | Age: 59
End: 2019-10-24
Payer: COMMERCIAL

## 2019-10-25 ENCOUNTER — APPOINTMENT (OUTPATIENT)
Dept: PHYSICAL THERAPY | Age: 59
End: 2019-10-25
Payer: COMMERCIAL

## 2019-10-31 ENCOUNTER — APPOINTMENT (OUTPATIENT)
Dept: PHYSICAL THERAPY | Age: 59
End: 2019-10-31
Payer: COMMERCIAL

## 2019-11-01 ENCOUNTER — APPOINTMENT (OUTPATIENT)
Dept: PHYSICAL THERAPY | Age: 59
End: 2019-11-01

## 2019-11-05 NOTE — PROGRESS NOTES
[facility]  [address] [facility] [address]  [place], [address]  [phone] fax    Physical Therapy Discharge Summary  Patient name: Travis Angelucci Start of Care: 2019   Referral source: Gracia Parker MD : 1960   Medical/Treatment Diagnosis: Cervicalgia [M54.2]  Payor: Lenny Pérez / Plan: [address] [place] [address] / Product Type: PPO /  Onset Date:4 Months      Prior Hospitalization: see medical history Provider#: [id number]   Medications: Verified on Patient Summary List     Comorbidities: diabetes, OA, HTN  Prior Level of Function:functionally I with daily tasks  Visits from Start of Care: 5    Missed Visits: 3  Reporting Period : 2019 to 10/10/2019      Summary of Care:  Updated Goals: to be achieved in 4 weeks:              Long Term Goals: To be accomplished in 4 weeks:               1. Improve FOTO score to predicted outcome to improve ability for functional tasks - Finished intake survey. 2019               2. Improve c/s AROM rotation to 40 degrees to improve ability for driving and other daily activities. - AROM C/S Rotation Left 29 degrees and Right 52 degrees.  2019 slow progress- 33 deg left 52 deg right 10/10/19               3. Pt will report at least 50% reduction in headaches to improve ability for job activitese - No change in HA's at this time per pt. 2019; no change in headaches per pt report 10/10/19               4. Pt will demonstrate 4+/5 mid trap strength to improve ability for lifting at work. - Was not reassessed. 2019      ASSESSMENT/RECOMMENDATIONS: Pt with poor attendance following last progress note. Will D/C a this time without ability to further assess patient.     [x]Discontinue therapy: []Patient has reached or is progressing toward set goals      [x]Patient is non-compliant or has abdicated      []Due to lack of appreciable progress towards set [address] Nancy Clay DPT, CMTPT 2019 2:12 PM

## 2020-01-16 ENCOUNTER — OFFICE VISIT (OUTPATIENT)
Dept: ORTHOPEDIC SURGERY | Age: 60
End: 2020-01-16

## 2020-01-16 VITALS
SYSTOLIC BLOOD PRESSURE: 134 MMHG | WEIGHT: 258 LBS | DIASTOLIC BLOOD PRESSURE: 78 MMHG | HEART RATE: 100 BPM | HEIGHT: 75 IN | RESPIRATION RATE: 24 BRPM | TEMPERATURE: 98.2 F | BODY MASS INDEX: 32.08 KG/M2 | OXYGEN SATURATION: 95 %

## 2020-01-16 DIAGNOSIS — M54.12 CERVICAL RADICULOPATHY: Primary | ICD-10-CM

## 2020-01-16 RX ORDER — INSULIN PUMP SYRINGE, 3 ML
EACH MISCELLANEOUS
COMMUNITY
Start: 2016-08-10

## 2020-01-16 RX ORDER — GLIMEPIRIDE 2 MG/1
2 TABLET ORAL
COMMUNITY

## 2020-01-16 RX ORDER — TELMISARTAN 80 MG/1
80 TABLET ORAL
COMMUNITY

## 2020-01-16 RX ORDER — DICLOFENAC SODIUM 75 MG/1
75 TABLET, DELAYED RELEASE ORAL 2 TIMES DAILY
COMMUNITY
End: 2022-04-19

## 2020-01-16 RX ORDER — FINASTERIDE 5 MG/1
5 TABLET, FILM COATED ORAL DAILY
COMMUNITY

## 2020-01-16 NOTE — PROGRESS NOTES
[address]  [place], [address] [place] [address] Street  Phone: [phone]  Fax: [phone]        Miky Payer  : 1960  PCP: Nacho Carter MD  2020    NEW PATIENT      HISTORY OF PRESENT ILLNESS  Michelle Foster is a 61 y.o. male c/o neck pain with paraesthesia radiating into the left shoulder in a C4 distribution x 7 months. Pt denies radiation further into the LUE or hand. He has attended PT with dry needling (19-10/10/19; [place] [address]) with minimal benefit. Pt notes that he has to sleep in certain positions or it exacerbates his symptoms. He denies limited ROM of his left shoulder. Pt reports some episodes of functional bladder incontinence that he has not yet addressed with his urologist. He currently takes Flomax for his prostate. He denies bowel incontinence or paraesthesia or weakness in his lower extremities. Pt was previously seen 16 as a new patient with c/o lumbar and cervical pain that appeared to be myofascial pain and due to facet syndrome. He had completed several sessions of cervical RFA prior to moving to the area, so he was referred to Dr. Capo Fairbanks to complete the process. He tried lumbar KRAIG and RFA without benefit. He tried Tramadol, Gabapentin, and Baclofen with mild relief. He was also referred to PT and prescribed diclofenac and Tramadol. Cervical spine MRI dated 10/29/19 reviewed. Per report, C3-4: Mild disc bulging and moderate left facet joint arthrosis moderately narrowing the left neural foramen. C4-5: Left disc protrusion and uncovertebral joint hypertrophy narrowing the neural canal and left neural foramen. C5-6: large left disc protrusion and severe left uncovertebral joint hypertrophy narrowing the neural canal and left neural foramen. C6-7: Moderate disc extrusion mildly narrowing the neural canal. . He works as a . He rates his pain as a 7/10 today.     ASSESSMENT  His symptoms are likely due to a left C4 radiculopathy as evidenced on his MRI. On examination, he had a positive Spurling's sign on the left. PLAN  1. Referral for cervical interlaminar injections. Pt will f/u in after cervical interlaminar injections or sooner if needed. Diagnoses and all orders for this visit:    1. Cervical radiculopathy  -     REFERRAL TO PAIN MANAGEMENT       CHIEF COMPLAINT  Michelle Foster is seen today in consultation at the request of Nacho Carter MD for complaints of neck pain radiating into the left shoulder. PAST MEDICAL HISTORY   Past Medical History:   Diagnosis Date    Chronic low back pain     Degeneration of lumbar intervertebral disc     Diabetes ([address]     Diabetes mellitus ([address]     Essential hypertension     Hyperlipidemia     Hypertension     Shoulder pain        Past Surgical History:   Procedure Laterality Date    HX COLONOSCOPY      HX HERNIA REPAIR      HX OTHER SURGICAL      knee surgery       MEDICATIONS    Current Outpatient Medications   Medication Sig Dispense Refill    Blood-Glucose Meter (ONETOUCH VERIO IQ METER) monitoring kit Use to test blood sugars twice daily as directed      blood sugar diagnostic (CONTOUR NEXT TEST STRIPS) Contour Next Test Strips   Check blood glucose bid Diagnosis: DM (250.00)      glucose blood VI test strips (BLOOD GLUCOSE TEST) strip Use to test blood sugars  daily as directed      amlodipine besylate (AMLODIPINE PO) Take  by mouth.  tamsulosin (FLOMAX) 0.4 mg capsule Take 1 Cap by mouth daily (after dinner). 90 Cap 3    levoFLOXacin (LEVAQUIN) 750 mg tablet Take 1 Tab by mouth daily. 1 Tab 0    baclofen (LIORESAL) 10 mg tablet Take  by mouth three (3) times daily.  tadalafil (CIALIS) 5 mg tablet Take 5 mg by mouth.  gabapentin (NEURONTIN) 300 mg capsule Take 300 mg by mouth three (3) times daily.  hydroCHLOROthiazide (HYDRODIURIL) 25 mg tablet Take 25 mg by mouth daily.       HYDROcodone-acetaminophen (NORCO) 5-325 mg per tablet Take  by mouth.  metFORMIN (GLUCOPHAGE) 500 mg tablet Take  by mouth two (2) times daily (with meals).  predniSONE (DELTASONE) 10 mg tablet Take  by mouth daily (with breakfast).  simvastatin (ZOCOR) 10 mg tablet Take  by mouth nightly.  traMADol (ULTRAM) 50 mg tablet Take 50 mg by mouth every six (6) hours as needed for Pain.  traMADol (ULTRAM) 50 mg tablet TAKE ONE TABLET BY MOUTH THREE TIMES A DAY AS NEEDED FOR PAIN 90 Tab 0    aspirin 81 mg chewable tablet Take 81 mg by mouth daily.  baclofen (LIORESAL) 10 mg tablet   0    gabapentin (NEURONTIN) 300 mg capsule   1    hydrochlorothiazide (HYDRODIURIL) 25 mg tablet   2    metFORMIN (GLUCOPHAGE) 500 mg tablet   1    simvastatin (ZOCOR) 10 mg tablet   1       ALLERGIES  Allergies   Allergen Reactions    Lisinopril Cough          SOCIAL HISTORY    Social History     Socioeconomic History    Marital status: UNKNOWN     Spouse name: Not on file    Number of children: Not on file    Years of education: Not on file    Highest education level: Not on file   Tobacco Use    Smoking status: Never Smoker    Smokeless tobacco: Never Used   Substance and Sexual Activity    Alcohol use: No     Alcohol/week: 0.0 standard drinks    Drug use: No    Sexual activity: Not Currently   Social History Narrative    ** Merged History Encounter **            FAMILY HISTORY  Family History   Problem Relation Age of Onset    Hypertension Mother     Diabetes Father     Heart Failure Father     Hypertension Father          REVIEW OF SYSTEMS  Review of Systems   Musculoskeletal: Positive for neck pain.         Left shoulder paraesthesia         PHYSICAL EXAMINATION  Visit Vitals  /78   Pulse 100   Temp 98.2 °F (36.8 °C) (Oral)   Resp 24   Ht 6' 3\" (1.905 m)   Wt 258 lb (117 kg)   SpO2 95%   BMI 32.25 kg/m²         Pain Assessment  1/16/2020   Location of Pain Neck   Severity of Pain 7   Quality of Pain Throbbing Quality of Pain Comment \"tingling on left shoulder\"   Frequency of Pain Intermittent   Result of Injury No         Constitutional:  Well developed, well nourished, in no acute distress. Psychiatric: Affect and mood are appropriate. HEENT: Normocephalic, atraumatic. Extraocular movements intact. Integumentary: No rashes or abrasions noted on exposed areas. Cardiovascular: Regular rate and rhythm. Pulmonary: Clear to auscultation bilaterally. SPINE/MUSCULOSKELETAL EXAM    Cervical spine:  Neck is midline. Normal muscle tone. No focal atrophy is noted. ROM pain free. Shoulder ROM intact. No tenderness to palpation. Positive Spurling's sign on the left. Negative Tinel's sign. Negative Connor's sign. Sensation in the bilateral arms grossly intact to light touch. MOTOR:      Biceps  Triceps Deltoids Wrist Ext Wrist Flex Hand Intrin   Right 5/5 5/5 5/5 5/5 5/5 5/5   Left 5/5 5/5 5/5 5/5 5/5 5/5             Hip Flex  Quads Hamstrings Ankle DF EHL Ankle PF   Right 5/5 5/5 5/5 5/5 5/5 5/5   Left 5/5 5/5 5/5 5/5 5/5 5/5     DTRs are 2+ biceps, triceps, brachioradialis, patella, and Achilles. Negative Straight Leg raise. Squat not tested. No difficulty with tandem gait. Ambulation without assistive device. FWB. RADIOGRAPHS  Cervical MRI images taken on 10/25/19 personally reviewed with patient:  Alignment: Within normal limits. Vertebral bodies: No compression fractures. Spinal cord: Deformed in shape by degenerative disc disease. No evident intrinsic spinal cord abnormality. Craniocervical junction: Within normal limits. C1-2: Within normal limits. C3-4:Normal disc height. Moderate disc desiccation. Mild disc bulging and bilateral uncovertebral joint hypertrophy. Moderate left facet joint arthrosis and neural foraminal stenosis. Anterior-posterior dimension of the thecal sac at the midline measures 11 mm. C4-5: Moderate disc narrowing and desiccation. Diffuse disc bulging with accompanying osteophytes. Moderate left disc protrusion. Mild-moderate right and moderately severe left uncovertebral joint hypertrophy and neural foraminal stenosis. Anterior-posterior dimension of the thecal sac at the midline measures 9.5 mm. C5-6: Moderate disc narrowing and desiccation. Moderately large left disc protrusion with accompanying osteophytes and severe left uncovertebral joint hypertrophy mild-moderately narrowing the neural canal and severely narrowing the left neural foramen. Moderate right disc protrusion with accompanying osteophytes and moderate right uncovertebral joint hypertrophy. C6-7: Mild disc narrowing and desiccation. Moderate central disc extrusion, eccentric slightly to the right of midline. Mild right uncovertebral joint hypertrophy and neural foraminal stenosis. Anterior-posterior dimension if the thecal sac at the midline measures 9 mm. Other levels show normal disc height and hydration, no disc protrusion, normal appearance of the facet joints, and no nerve root impingement. Upper thoracic spine: Within normal limits. IMPRESSION:  C3-4: Mild disc bulging and moderate left facet joint arthrosis moderately narrowing the left neural foramen. C4-5: Left disc protrusion and uncovertebral joint hypertrophy narrowing the neural canal and left neural foramen. C5-6: large left disc protrusion and severe left uncovertebral joint hypertrophy narrowing the neural canal and left neural foramen. C6-7: Moderate disc extrusion mildly narrowing the neural canal.    Cervical XR images taken on 8/16/19 personally reviewed with patient:  AP, lateral,  both oblique, and odontoid views are obtained. Disc  space narrowing is present at the C4-C5, C5-C6 and C6-C7 levels. Discal spurring  is present at all levels. No significant neural foraminal narrowing is seen. There is no fracture or subluxation.  Prevertebral soft tissues and predental  space are normal.     IMPRESSION  Impression:      Multilevel degenerative disc disease and osteoarthritic changes. No acute  abnormalities.  reviewed    Mr. Shelly Chinchilla has a reminder for a \"due or due soon\" health maintenance. I have asked that he contact his primary care provider for follow-up on this health maintenance. 20 minutes of face-to-face contact were spent with the patient during today's visit extensively discussing symptoms and treatment plan. All questions were answered. More than half of this visit today was spent on counseling. Written by Nelia Piper, as dictated by Dr. Romelia Castañeda. I, Dr. Romelia Castañeda, confirm that all documentation is accurate.

## 2020-08-25 ENCOUNTER — OFFICE VISIT (OUTPATIENT)
Dept: ORTHOPEDIC SURGERY | Age: 60
End: 2020-08-25

## 2020-08-25 VITALS
WEIGHT: 254.8 LBS | DIASTOLIC BLOOD PRESSURE: 80 MMHG | SYSTOLIC BLOOD PRESSURE: 126 MMHG | HEART RATE: 93 BPM | BODY MASS INDEX: 31.68 KG/M2 | TEMPERATURE: 97.3 F | OXYGEN SATURATION: 95 % | HEIGHT: 75 IN

## 2020-08-25 DIAGNOSIS — M54.12 CERVICAL RADICULOPATHY: Primary | ICD-10-CM

## 2020-09-04 ENCOUNTER — OFFICE VISIT (OUTPATIENT)
Dept: ORTHOPEDIC SURGERY | Age: 60
End: 2020-09-04

## 2020-09-04 VITALS
HEART RATE: 77 BPM | DIASTOLIC BLOOD PRESSURE: 79 MMHG | RESPIRATION RATE: 16 BRPM | WEIGHT: 255.8 LBS | SYSTOLIC BLOOD PRESSURE: 129 MMHG | TEMPERATURE: 97.7 F | BODY MASS INDEX: 31.97 KG/M2

## 2020-09-04 DIAGNOSIS — G95.9 CERVICAL MYELOPATHY (HCC): Primary | ICD-10-CM

## 2020-09-04 DIAGNOSIS — M50.00 HNP (HERNIATED NUCLEUS PULPOSUS) WITH MYELOPATHY, CERVICAL: ICD-10-CM

## 2020-09-04 DIAGNOSIS — M50.30 DDD (DEGENERATIVE DISC DISEASE), CERVICAL: ICD-10-CM

## 2020-09-04 DIAGNOSIS — M48.02 CERVICAL SPINAL STENOSIS: ICD-10-CM

## 2020-09-04 NOTE — LETTER
9/4/20 Patient: Bala Moreau YOB: 1960 Date of Visit: 9/4/2020 [address], [address] [facility] [address] [facility] [address] VIA Facsimile: [phone] Yessica Young MD 
[address] [place] [address] [address] [facility] [address] VIA In Basket Dear [address], DO Yessica Young MD, Thank you for referring Mr. Usama Barton to [address] [place] [address] for evaluation. My notes for this consultation are attached. If you have questions, please do not hesitate to call me. I look forward to following your patient along with you.  
 
 
Sincerely, 
 
Marnie Akers MD

## 2020-09-04 NOTE — PROGRESS NOTES
[facility]  [address], [address]  [facility], [address]  Phone: [phone]  Fax: [phone]  INITIAL CONSULTATION  Patient: Elvie Robertson                MRN: [id number]       SSN: [id number]  YOB: 1960        AGE: 61 y.o. SEX: male  Body mass index is 31.97 kg/m². PCP: Jadiel Jamison DO  09/04/20    Chief Complaint   Patient presents with    Neck Pain     SC         HISTORY OF PRESENT ILLNESS, RADIOGRAPHS, and PLAN:       Mr. Julio Armando is seen today at the request of Dr. Ricardo Miller. Mr. Julio Armando is a 59-year-old male well-controlled diabetic works as a  at 10 years of progressive neck pain but much worse over the past year or 2 with a progression of neck pain radiating to his left greater than right right trapezius his exam is significant for loss of intrinsic strength on the left poor tandem gait. He has no clonus Connor's or Babinski. MRI demonstrates cervical spondylitic changes with cervical stenosis and cord compression left greater than right at C4-5 and 5 6 with more moderate changes at C6-7 with right greater than left cord compression. Patient's been through extensive conservative care he has had physical therapy injections RFA in the like which is ablated some of his neck pain but his neurologic symptoms appear to have progressed. I discussed the matter at length with him. I believe the patient has a cervical myeloradiculopathy with weakness of his intrinsics and gait disturbance in addition to neck pain surgery for this would be a cervical decompression fusion C4 [id number]. Risks benefits complications alternatives discussed and the patient consents. This dictation was created utilizing voice recognition software. Errors may be present.      Past Medical History:   Diagnosis Date    Chronic low back pain     Degeneration of lumbar intervertebral disc     Diabetes ([address]     Diabetes mellitus ([address]     Essential hypertension     Hyperlipidemia     Hypertension     Shoulder pain        Family History   Problem Relation Age of Onset    Hypertension Mother     Diabetes Father     Heart Failure Father     Hypertension Father        Current Outpatient Medications   Medication Sig Dispense Refill    glucose blood VI test strips (BLOOD GLUCOSE TEST) strip Use to test blood sugars  daily as directed      Blood-Glucose Meter (ONETOUCH VERIO IQ METER) monitoring kit Use to test blood sugars twice daily as directed      blood sugar diagnostic (CONTOUR NEXT TEST STRIPS) Contour Next Test Strips   Check blood glucose bid Diagnosis: DM (250.00)      telmisartan (MICARDIS) 80 mg tablet Take 80 mg by mouth daily.  finasteride (PROSCAR) 5 mg tablet Take 5 mg by mouth daily.  cyclobenzaprine HCl (FLEXERIL PO) Take 5 mg by mouth three (3) times daily as needed.  glimepiride (AMARYL) 2 mg tablet Take  by mouth every morning.  diclofenac EC (VOLTAREN) 75 mg EC tablet Take  by mouth.  amlodipine besylate (AMLODIPINE PO) Take  by mouth.  tamsulosin (FLOMAX) 0.4 mg capsule Take 1 Cap by mouth daily (after dinner). 90 Cap 3    levoFLOXacin (LEVAQUIN) 750 mg tablet Take 1 Tab by mouth daily. 1 Tab 0    tadalafil (CIALIS) 5 mg tablet Take 5 mg by mouth.  hydroCHLOROthiazide (HYDRODIURIL) 25 mg tablet Take 25 mg by mouth daily.  metFORMIN (GLUCOPHAGE) 500 mg tablet Take  by mouth two (2) times daily (with meals).  predniSONE (DELTASONE) 10 mg tablet Take  by mouth daily (with breakfast).  traMADol (ULTRAM) 50 mg tablet TAKE ONE TABLET BY MOUTH THREE TIMES A DAY AS NEEDED FOR PAIN 90 Tab 0    aspirin 81 mg chewable tablet Take 81 mg by mouth daily.       baclofen (LIORESAL) 10 mg tablet   0    gabapentin (NEURONTIN) 300 mg capsule   1    hydrochlorothiazide (HYDRODIURIL) 25 mg tablet   2    metFORMIN (GLUCOPHAGE) 500 mg tablet   1    simvastatin (ZOCOR) 10 mg tablet   1    baclofen (LIORESAL) 10 mg tablet Take  by mouth three (3) times daily.  gabapentin (NEURONTIN) 300 mg capsule Take 300 mg by mouth three (3) times daily.  HYDROcodone-acetaminophen (NORCO) 5-325 mg per tablet Take  by mouth.  simvastatin (ZOCOR) 10 mg tablet Take  by mouth nightly.  traMADol (ULTRAM) 50 mg tablet Take 50 mg by mouth every six (6) hours as needed for Pain.          Allergies   Allergen Reactions    Lisinopril Cough       Past Surgical History:   Procedure Laterality Date    HX COLONOSCOPY      HX HERNIA REPAIR      HX OTHER SURGICAL      knee surgery       Past Medical History:   Diagnosis Date    Chronic low back pain     Degeneration of lumbar intervertebral disc     [facility]     Diabetes mellitus ([facility]     Essential hypertension     Hyperlipidemia     Hypertension     Shoulder pain        Social History     Socioeconomic History    Marital status: UNKNOWN     Spouse name: Not on file    Number of children: Not on file    Years of education: Not on file    Highest education level: Not on file   Occupational History    Not on file   Social Needs    Financial resource strain: Not on file    Food insecurity     Worry: Not on file     Inability: Not on file    Transportation needs     Medical: Not on file     Non-medical: Not on file   Tobacco Use    Smoking status: Never Smoker    Smokeless tobacco: Never Used   Substance and Sexual Activity    Alcohol use: No     Alcohol/week: 0.0 standard drinks    Drug use: No    Sexual activity: Not Currently   Lifestyle    Physical activity     Days per week: Not on file     Minutes per session: Not on file    Stress: Not on file   Relationships    Social connections     Talks on phone: Not on file     Gets together: Not on file     Attends Mosque service: Not on file     Active member of club or organization: Not on file     Attends meetings of clubs or organizations: Not on file     Relationship status: Not on file  Intimate partner violence     Fear of current or ex partner: Not on file     Emotionally abused: Not on file     Physically abused: Not on file     Forced sexual activity: Not on file   Other Topics Concern    Not on file   Social History Narrative    ** Merged History Encounter **                REVIEW OF SYSTEMS:   CONSTITUTIONAL SYMPTOMS:  Negative. EYES:  Negative. EARS, NOSE, THROAT AND MOUTH:  Negative. CARDIOVASCULAR:  Negative. RESPIRATORY:  Negative. GENITOURINARY: Per HPI. GASTROINTESTINAL:  Per HPI. INTEGUMENTARY (SKIN AND/OR BREAST):  Negative. MUSCULOSKELETAL: Per HPI.   ENDOCRINE/RHEUMATOLOGIC:  Negative. NEUROLOGICAL:  Per HPI. HEMATOLOGIC/LYMPHATIC:  Negative. ALLERGIC/IMMUNOLOGIC:  Negative. PSYCHIATRIC:  Negative. PHYSICAL EXAMINATION:   Visit Vitals  /79 (BP 1 Location: Right arm, BP Patient Position: Sitting)   Pulse 77   Temp 97.7 °F (36.5 °C) (Temporal)   Resp 16   Wt 255 lb 12.8 oz (116 kg)   BMI 31.97 kg/m²    PAIN SCALE: 6/10    CONSTITUTIONAL: The patient is in no apparent distress and is alert and oriented x 3. HEENT: Normocephalic. Hearing grossly intact. NECK: Supple and symmetric. no tenderness, or masses were felt. RESPIRATORY: No labored breathing. CARDIOVASCULAR: The carotid pulses were normal. Peripheral pulses were 2+. CHEST: Normal AP diameter and normal contour without any kyphoscoliosis. LYMPHATIC: No lymphadenopathy was appreciated in the neck, axillae or groin. SKIN:  Negative for scars, rashes, lesions, or ulcers on the right upper, right lower, left upper, left lower and trunk. NEUROLOGICAL: Alert and oriented x 3. Ambulation without assistive device. Gait imbalance. EXTREMITIES:  See musculoskeletal.  MUSCULOSKELETAL:   Head and Neck: Neck pain, L>R, radiating to L shoulder. Negative for misalignment, asymmetry, crepitation, defects, tenderness masses or effusions.  Left Upper Extremity: Intermittent hand paresthesias. Intrinsic weakness. Inspection, percussion and palpation performed. Connors sign is negative.  Right Upper Extremity: Intermittent hand paresthesias. Inspection, percussion and palpation performed. Connors sign is negative.  Spine, Ribs and Pelvis: Inspection, percussion and palpation performed. Negative for misalignment, asymmetry, crepitation, defects, tenderness masses or effusions.  Left Lower Extremity: Inspection, percussion and palpation performed. Negative straight leg raise.  Right Lower Extremity: Inspection, percussion and palpation performed. Negative straight leg raise. SPINE EXAM:     Cervical spine: Neck is midline. Normal muscle tone. No focal atrophy is noted. Lumbar spine: No rash, ecchymosis, or gross obliquity. No focal atrophy is noted. ASSESSMENT    ICD-10-CM ICD-9-CM    1. Cervical myelopathy (HCC)  G95.9 721.1    2. Cervical spinal stenosis  M48.02 723.0    3. HNP (herniated nucleus pulposus) with myelopathy, cervical  M50.00 722.71    4. DDD (degenerative disc disease), cervical  M50.30 722.4        Written by Steph Roche, as dictated by Polo Main MD.    I, Dr. Polo Main MD, confirm that all documentation is accurate.

## 2020-09-04 NOTE — PROGRESS NOTES
[facility]   Pre-Surgical Worksheet    Patient: Mell Navarro                         MRN: [id number]     Age:  61 y.o.,      Sex: male    YOB: 1960           JACI: September 4, 2020  PCP: Adelita Riley, DO    Allergies   Allergen Reactions    Lisinopril Cough         ICD-10-CM ICD-9-CM    1. Cervical myelopathy (HCC)  G95.9 721.1 CBC W/O DIFF      EKG, 12 LEAD, INITIAL      METABOLIC PANEL, COMPREHENSIVE      NOVEL CORONAVIRUS (COVID-19)   2. Cervical spinal stenosis  M48.02 723.0 CBC W/O DIFF      EKG, 12 LEAD, INITIAL      METABOLIC PANEL, COMPREHENSIVE      NOVEL CORONAVIRUS (COVID-19)   3. HNP (herniated nucleus pulposus) with myelopathy, cervical  M50.00 722.71 CBC W/O DIFF      EKG, 12 LEAD, INITIAL      METABOLIC PANEL, COMPREHENSIVE      NOVEL CORONAVIRUS (COVID-19)   4. DDD (degenerative disc disease), cervical  M50.30 722.4 CBC W/O DIFF      EKG, 12 LEAD, INITIAL      METABOLIC PANEL, COMPREHENSIVE      NOVEL CORONAVIRUS (COVID-19)       Surgery: ACDF C4/5 C5/6 C6/7. Pain Assessment   Pain Assessment  9/4/2020   Location of Pain Neck   Severity of Pain 6   Quality of Pain Aching   Quality of Pain Comment -   Frequency of Pain Constant   Aggravating Factors Standing   Aggravating Factors Comment looking down   Limiting Behavior Some   Relieving Factors Ice;Heat   Relieving Factors Comment meds help   Result of Injury No       Visit Vitals  /79 (BP 1 Location: Right arm, BP Patient Position: Sitting)   Pulse 77   Temp 97.7 °F (36.5 °C) (Temporal)   Resp 16   Wt 255 lb 12.8 oz (116 kg)   BMI 31.97 kg/m²       ADL Limits: Pt stated that he is always in pain. Frequent breaks help with getting through the day. Spine Surgery?: No:  When ? Bharati Payne Where? .    Spinal Injections?: Yes  When ? Bharati Payne Where [facility]. Physical Therapy?: Yes  When ? Bharati Payne Where [place]. NSAID's?: Yes    Pain Medications?: No:   Type: ? Bharati Payne     In Pain Management: NO, Where: ?    Current Outpatient Medications   Medication Sig    glucose blood VI test strips (BLOOD GLUCOSE TEST) strip Use to test blood sugars  daily as directed    Blood-Glucose Meter (ONETOUCH VERIO IQ METER) monitoring kit Use to test blood sugars twice daily as directed    blood sugar diagnostic (CONTOUR NEXT TEST STRIPS) Contour Next Test Strips   Check blood glucose bid Diagnosis: DM (250.00)    telmisartan (MICARDIS) 80 mg tablet Take 80 mg by mouth daily.  finasteride (PROSCAR) 5 mg tablet Take 5 mg by mouth daily.  cyclobenzaprine HCl (FLEXERIL PO) Take 5 mg by mouth three (3) times daily as needed.  glimepiride (AMARYL) 2 mg tablet Take  by mouth every morning.  diclofenac EC (VOLTAREN) 75 mg EC tablet Take  by mouth.  amlodipine besylate (AMLODIPINE PO) Take  by mouth.  tamsulosin (FLOMAX) 0.4 mg capsule Take 1 Cap by mouth daily (after dinner).  levoFLOXacin (LEVAQUIN) 750 mg tablet Take 1 Tab by mouth daily.  tadalafil (CIALIS) 5 mg tablet Take 5 mg by mouth.  hydroCHLOROthiazide (HYDRODIURIL) 25 mg tablet Take 25 mg by mouth daily.  metFORMIN (GLUCOPHAGE) 500 mg tablet Take  by mouth two (2) times daily (with meals).  predniSONE (DELTASONE) 10 mg tablet Take  by mouth daily (with breakfast).  traMADol (ULTRAM) 50 mg tablet TAKE ONE TABLET BY MOUTH THREE TIMES A DAY AS NEEDED FOR PAIN    aspirin 81 mg chewable tablet Take 81 mg by mouth daily.  baclofen (LIORESAL) 10 mg tablet     gabapentin (NEURONTIN) 300 mg capsule     hydrochlorothiazide (HYDRODIURIL) 25 mg tablet     metFORMIN (GLUCOPHAGE) 500 mg tablet     simvastatin (ZOCOR) 10 mg tablet     baclofen (LIORESAL) 10 mg tablet Take  by mouth three (3) times daily.  gabapentin (NEURONTIN) 300 mg capsule Take 300 mg by mouth three (3) times daily.  HYDROcodone-acetaminophen (NORCO) 5-325 mg per tablet Take  by mouth.  simvastatin (ZOCOR) 10 mg tablet Take  by mouth nightly.     traMADol (ULTRAM) 50 mg tablet Take 50 mg by mouth every six (6) hours as needed for Pain. No current facility-administered medications for this visit.         Past Medical History:   Diagnosis Date    Chronic low back pain     Degeneration of lumbar intervertebral disc     [facility] [address] [address]     Diabetes mellitus ([facility] [address]     Essential hypertension     Hyperlipidemia     Hypertension     Shoulder pain        Past Surgical History:   Procedure Laterality Date    HX COLONOSCOPY      HX HERNIA REPAIR      HX OTHER SURGICAL      knee surgery       Social History     Socioeconomic History    Marital status: UNKNOWN     Spouse name: Not on file    Number of children: Not on file    Years of education: Not on file    Highest education level: Not on file   Tobacco Use    Smoking status: Never Smoker    Smokeless tobacco: Never Used   Substance and Sexual Activity    Alcohol use: No     Alcohol/week: 0.0 standard drinks    Drug use: No    Sexual activity: Not Currently   Social History Narrative    ** Merged History Encounter **

## 2020-09-10 ENCOUNTER — DOCUMENTATION ONLY (OUTPATIENT)
Dept: ORTHOPEDIC SURGERY | Age: 60
End: 2020-09-10

## 2020-09-10 NOTE — PROGRESS NOTES
PT DROPPED OFF [facility] PAPERWORK FOR DR Brittany Kelly.  PLEASE FAX [phone] AND CALL PT TO P/U ORIGINALS AT [facility]

## 2020-09-11 ENCOUNTER — DOCUMENTATION ONLY (OUTPATIENT)
Dept: ORTHOPEDIC SURGERY | Age: 60
End: 2020-09-11

## 2020-09-11 NOTE — PROGRESS NOTES
Form completed and waiting for Dr. Marysol Irvin signature.      Given to Jose Cortes to obtain Tuesday

## 2020-10-01 ENCOUNTER — HOSPITAL ENCOUNTER (OUTPATIENT)
Dept: LAB | Age: 60
Discharge: HOME | End: 2020-10-01
Payer: COMMERCIAL

## 2020-10-01 DIAGNOSIS — M48.02 CERVICAL SPINAL STENOSIS: ICD-10-CM

## 2020-10-01 DIAGNOSIS — G95.9 CERVICAL MYELOPATHY (HCC): ICD-10-CM

## 2020-10-01 DIAGNOSIS — M50.00 HNP (HERNIATED NUCLEUS PULPOSUS) WITH MYELOPATHY, CERVICAL: ICD-10-CM

## 2020-10-01 DIAGNOSIS — M50.30 DDD (DEGENERATIVE DISC DISEASE), CERVICAL: ICD-10-CM

## 2020-10-01 LAB
ALBUMIN SERPL-MCNC: 4.2 G/DL (ref 3.4–5)
ALBUMIN/GLOB SERPL: 1.3 {RATIO} (ref 0.8–1.7)
ALP SERPL-CCNC: 48 U/L (ref 45–117)
ALT SERPL-CCNC: 54 U/L (ref 16–61)
ANION GAP SERPL CALC-SCNC: 7 MMOL/L (ref 3–18)
AST SERPL-CCNC: 43 U/L (ref 10–38)
ATRIAL RATE: 69 BPM
BILIRUB SERPL-MCNC: 2.3 MG/DL (ref 0.2–1)
BUN SERPL-MCNC: 23 MG/DL (ref 7–18)
BUN/CREAT SERPL: 16 (ref 12–20)
CALCIUM SERPL-MCNC: 9.5 MG/DL (ref 8.5–10.1)
CALCULATED P AXIS, ECG09: 4 DEGREES
CALCULATED R AXIS, ECG10: -13 DEGREES
CALCULATED T AXIS, ECG11: 19 DEGREES
CHLORIDE SERPL-SCNC: 102 MMOL/L (ref 100–111)
CO2 SERPL-SCNC: 29 MMOL/L (ref 21–32)
CREAT SERPL-MCNC: 1.48 MG/DL (ref 0.6–1.3)
DIAGNOSIS, 93000: NORMAL
ERYTHROCYTE [DISTWIDTH] IN BLOOD BY AUTOMATED COUNT: 12.1 % (ref 11.6–14.5)
GLOBULIN SER CALC-MCNC: 3.3 G/DL (ref 2–4)
GLUCOSE SERPL-MCNC: 78 MG/DL (ref 74–99)
HCT VFR BLD AUTO: 41.6 % (ref 36–48)
HGB BLD-MCNC: 14.7 G/DL (ref 13–16)
MCH RBC QN AUTO: 33.3 PG (ref 24–34)
MCHC RBC AUTO-ENTMCNC: 35.3 G/DL (ref 31–37)
MCV RBC AUTO: 94.3 FL (ref 74–97)
P-R INTERVAL, ECG05: 160 MS
PLATELET # BLD AUTO: 230 K/UL (ref 135–420)
PMV BLD AUTO: 10.6 FL (ref 9.2–11.8)
POTASSIUM SERPL-SCNC: 3.7 MMOL/L (ref 3.5–5.5)
PROT SERPL-MCNC: 7.5 G/DL (ref 6.4–8.2)
Q-T INTERVAL, ECG07: 408 MS
QRS DURATION, ECG06: 94 MS
QTC CALCULATION (BEZET), ECG08: 437 MS
RBC # BLD AUTO: 4.41 M/UL (ref 4.7–5.5)
SODIUM SERPL-SCNC: 138 MMOL/L (ref 136–145)
VENTRICULAR RATE, ECG03: 69 BPM
WBC # BLD AUTO: 7.6 K/UL (ref 4.6–13.2)

## 2020-10-01 PROCEDURE — 36415 COLL VENOUS BLD VENIPUNCTURE: CPT

## 2020-10-01 PROCEDURE — 85027 COMPLETE CBC AUTOMATED: CPT

## 2020-10-01 PROCEDURE — 80053 COMPREHEN METABOLIC PANEL: CPT

## 2020-10-01 PROCEDURE — 93005 ELECTROCARDIOGRAM TRACING: CPT

## 2020-10-07 ENCOUNTER — HOSPITAL ENCOUNTER (OUTPATIENT)
Dept: PREADMISSION TESTING | Age: 60
Discharge: HOME | End: 2020-10-07
Payer: COMMERCIAL

## 2020-10-07 DIAGNOSIS — M50.30 DDD (DEGENERATIVE DISC DISEASE), CERVICAL: ICD-10-CM

## 2020-10-07 DIAGNOSIS — M48.02 CERVICAL SPINAL STENOSIS: ICD-10-CM

## 2020-10-07 DIAGNOSIS — G95.9 CERVICAL MYELOPATHY (HCC): ICD-10-CM

## 2020-10-07 DIAGNOSIS — M50.00 HNP (HERNIATED NUCLEUS PULPOSUS) WITH MYELOPATHY, CERVICAL: ICD-10-CM

## 2020-10-07 PROCEDURE — 87635 SARS-COV-2 COVID-19 AMP PRB: CPT

## 2020-10-08 ENCOUNTER — DOCUMENTATION ONLY (OUTPATIENT)
Dept: ORTHOPEDIC SURGERY | Age: 60
End: 2020-10-08

## 2020-10-08 LAB — SARS-COV-2, COV2NT: NOT DETECTED

## 2020-10-08 NOTE — H&P
Pre-Admission History and Physical    Patient: Kendrick Ordaz   MRN: [id number]   SSN: [id number]   YOB: 1960   Age: 61 y.o. Sex: male     Patient scheduled for: ACDF C4/5, 5/6, 6/7. Date of surgery: 10/12/2020. Location of surgery: [facility]. Surgeon: Chuy Carl MD    HPI:  Kendrick Ordaz is a 61 y.o. male with 10 years of progressive neck pain, but much worse over the past year or 2 with a progression of neck pain radiating to his left greater than right. His exam is significant for loss of intrinsic strength on the left poor tandem gait. He has no clonus Connor's or Babinski. MRI demonstrates cervical spondylitic changes with cervical stenosis and cord compression left greater than right at C4-5 and 5 6 with more moderate changes at C6-7 with right greater than left cord compression. Patient's been through extensive conservative care. He has had physical therapy, injections, RFA and the like which has ablated some of his neck pain but his neurologic symptoms have progressed. He reports a pain level of 6/10   Pain has impacted the patient's functional ability to do all activities without pain. He is being admitted for surgical intervention.          Past Medical History:   Diagnosis Date    Chronic low back pain     Degeneration of lumbar intervertebral disc     Diabetes ([address]     [facility] mellitus ([facility] [address]     Essential hypertension     Hyperlipidemia     Hypertension     MRSA colonization 01/23/2020    nasal swab-pt was treated with Mupirocin    Shoulder pain      Social History     Socioeconomic History    Marital status: UNKNOWN     Spouse name: Not on file    Number of children: Not on file    Years of education: Not on file    Highest education level: Not on file   Tobacco Use    Smoking status: Never Smoker    Smokeless tobacco: Never Used   Substance and Sexual Activity    Alcohol use: No     Alcohol/week: 0.0 standard drinks    Drug use: No    Sexual activity: Not Currently   Social History Narrative    ** Merged History Encounter **          Past Surgical History:   Procedure Laterality Date    HX COLONOSCOPY      HX HERNIA REPAIR      HX OTHER SURGICAL Right 2005    knee surgery ACL    HX OTHER SURGICAL Right 02/2020    right knee replacement     Family History   Problem Relation Age of Onset    Hypertension Mother     Diabetes Father     Heart Failure Father     Hypertension Father      Allergies   Allergen Reactions    Lisinopril Cough     Current Outpatient Medications   Medication Sig Dispense Refill    telmisartan (MICARDIS) 80 mg tablet Take 80 mg by mouth daily.  finasteride (PROSCAR) 5 mg tablet Take 5 mg by mouth daily.  glimepiride (AMARYL) 2 mg tablet Take  by mouth every morning.  amlodipine besylate (AMLODIPINE PO) Take 5 mg by mouth.  tamsulosin (FLOMAX) 0.4 mg capsule Take 1 Cap by mouth daily (after dinner). 90 Cap 3    baclofen (LIORESAL) 10 mg tablet Take  by mouth three (3) times daily.  gabapentin (NEURONTIN) 300 mg capsule Take 300 mg by mouth three (3) times daily.  hydroCHLOROthiazide (HYDRODIURIL) 25 mg tablet Take 25 mg by mouth daily.  HYDROcodone-acetaminophen (NORCO) 5-325 mg per tablet Take  by mouth.  metFORMIN (GLUCOPHAGE) 500 mg tablet Take  by mouth two (2) times daily (with meals).  simvastatin (ZOCOR) 10 mg tablet Take  by mouth nightly.       traMADol (ULTRAM) 50 mg tablet TAKE ONE TABLET BY MOUTH THREE TIMES A DAY AS NEEDED FOR PAIN 90 Tab 0    glucose blood VI test strips (BLOOD GLUCOSE TEST) strip Use to test blood sugars  daily as directed      Blood-Glucose Meter (ONETOUCH VERIO IQ METER) monitoring kit Use to test blood sugars twice daily as directed      blood sugar diagnostic (CONTOUR NEXT TEST STRIPS) Contour Next Test Strips   Check blood glucose bid Diagnosis: DM (250.00)      cyclobenzaprine HCl (FLEXERIL PO) Take 5 mg by mouth three (3) times daily as needed.  diclofenac EC (VOLTAREN) 75 mg EC tablet Take  by mouth.  levoFLOXacin (LEVAQUIN) 750 mg tablet Take 1 Tab by mouth daily. 1 Tab 0    tadalafil (CIALIS) 5 mg tablet Take 5 mg by mouth.  predniSONE (DELTASONE) 10 mg tablet Take  by mouth daily (with breakfast).  traMADol (ULTRAM) 50 mg tablet Take 50 mg by mouth every six (6) hours as needed for Pain.  aspirin 81 mg chewable tablet Take 81 mg by mouth daily.  baclofen (LIORESAL) 10 mg tablet   0    gabapentin (NEURONTIN) 300 mg capsule   1    hydrochlorothiazide (HYDRODIURIL) 25 mg tablet   2    metFORMIN (GLUCOPHAGE) 500 mg tablet   1    simvastatin (ZOCOR) 10 mg tablet   1       ROS:  Denies chills, fever,night sweats,  bowel or bladder dysfunction, unexplained weight loss/weight gain, chest pain, sob or anxiety. Physical Examination    Gen: Well developed, well nourished 61 y.o. male   /79 (BP 1 Location: Right arm, BP Patient Position: Sitting)   Pulse 77   Temp 97.7 °F (36.5 °C) (Temporal)   Resp 16   Wt 255 lb 12.8 oz (116 kg)   BMI 31.97 kg/m²    PAIN SCALE: 6/10     CONSTITUTIONAL: The patient is in no apparent distress and is alert and oriented x 3. HEENT: Normocephalic. Hearing grossly intact. NECK: Supple and symmetric. no tenderness, or masses were felt. RESPIRATORY: No labored breathing. CARDIOVASCULAR: The carotid pulses were normal. Peripheral pulses were 2+. CHEST: Normal AP diameter and normal contour without any kyphoscoliosis. LYMPHATIC: No lymphadenopathy was appreciated in the neck, axillae or groin. SKIN:  Negative for scars, rashes, lesions, or ulcers on the right upper, right lower, left upper, left lower and trunk. NEUROLOGICAL: Alert and oriented x 3. Ambulation without assistive device. Gait imbalance. EXTREMITIES:  See musculoskeletal.  MUSCULOSKELETAL:  · Head and Neck: Neck pain, L>R, radiating to L shoulder.  Negative for misalignment, asymmetry, crepitation, defects, tenderness masses or effusions. · Left Upper Extremity: Intermittent hand paresthesias. Intrinsic weakness. Inspection, percussion and palpation performed. Connors sign is negative. · Right Upper Extremity: Intermittent hand paresthesias. Inspection, percussion and palpation performed. Connors sign is negative. · Spine, Ribs and Pelvis: Inspection, percussion and palpation performed. Negative for misalignment, asymmetry, crepitation, defects, tenderness masses or effusions. · Left Lower Extremity: Inspection, percussion and palpation performed. Negative straight leg raise. · Right Lower Extremity: Inspection, percussion and palpation performed. Negative straight leg raise.        SPINE EXAM:      Cervical spine: Neck is midline. Normal muscle tone. No focal atrophy is noted.     Lumbar spine: No rash, ecchymosis, or gross obliquity. No focal atrophy is noted.          Assessment and Plan    Due to the pt's persistent symptoms unrelieved by conservative measure yLnda Glaser is being admitted to [facility] to undergo surgical intervention. The post-operative plan of care consists of physical therapy, home health and a 2 week f/u office visit. We are pending medical clearance by Dr. Luis Piña. The risks, benefits, complications and alternatives to surgery have been discussed in detail with the patient. The patient understands and agrees to proceed.      Keri Briggs NP-MONTSERRAT dictating for Jono Coyle MD

## 2020-10-08 NOTE — PROGRESS NOTES
[employer] received a form from [facility] Attending Phy Statement. The form has been scanned in CC and put in the NP file to process.

## 2020-10-11 ENCOUNTER — ANESTHESIA EVENT (OUTPATIENT)
Dept: SURGERY | Age: 60
End: 2020-10-11
Payer: COMMERCIAL

## 2020-10-12 ENCOUNTER — TELEPHONE (OUTPATIENT)
Dept: ORTHOPEDIC SURGERY | Age: 60
End: 2020-10-12

## 2020-10-12 ENCOUNTER — APPOINTMENT (OUTPATIENT)
Dept: GENERAL RADIOLOGY | Age: 60
End: 2020-10-12
Attending: ORTHOPAEDIC SURGERY
Payer: COMMERCIAL

## 2020-10-12 ENCOUNTER — ANESTHESIA (OUTPATIENT)
Dept: SURGERY | Age: 60
End: 2020-10-12
Payer: COMMERCIAL

## 2020-10-12 ENCOUNTER — HOSPITAL ENCOUNTER (OUTPATIENT)
Age: 60
Setting detail: OBSERVATION
Discharge: HOME | End: 2020-10-13
Attending: ORTHOPAEDIC SURGERY | Admitting: ORTHOPAEDIC SURGERY
Payer: COMMERCIAL

## 2020-10-12 DIAGNOSIS — Z98.1 S/P CERVICAL SPINAL FUSION: Primary | ICD-10-CM

## 2020-10-12 PROBLEM — G95.9 CERVICAL MYELOPATHY (HCC): Status: ACTIVE | Noted: 2020-10-12

## 2020-10-12 LAB
ANION GAP SERPL CALC-SCNC: 10 MMOL/L (ref 3–18)
BUN SERPL-MCNC: 13 MG/DL (ref 7–18)
BUN/CREAT SERPL: 12 (ref 12–20)
CALCIUM SERPL-MCNC: 8.8 MG/DL (ref 8.5–10.1)
CHLORIDE SERPL-SCNC: 102 MMOL/L (ref 100–111)
CO2 SERPL-SCNC: 24 MMOL/L (ref 21–32)
CREAT SERPL-MCNC: 1.09 MG/DL (ref 0.6–1.3)
GLUCOSE BLD STRIP.AUTO-MCNC: 170 MG/DL (ref 70–110)
GLUCOSE BLD STRIP.AUTO-MCNC: 179 MG/DL (ref 70–110)
GLUCOSE BLD STRIP.AUTO-MCNC: 220 MG/DL (ref 70–110)
GLUCOSE BLD STRIP.AUTO-MCNC: 235 MG/DL (ref 70–110)
GLUCOSE SERPL-MCNC: 209 MG/DL (ref 74–99)
HBA1C MFR BLD: 6.8 % (ref 4.2–5.6)
POTASSIUM SERPL-SCNC: 3.4 MMOL/L (ref 3.5–5.5)
SODIUM SERPL-SCNC: 136 MMOL/L (ref 136–145)

## 2020-10-12 PROCEDURE — 77030004402 HC BUR NEUR STRY -C: Performed by: ORTHOPAEDIC SURGERY

## 2020-10-12 PROCEDURE — 99218 HC RM OBSERVATION: CPT

## 2020-10-12 PROCEDURE — 77030011267 HC ELECTRD BLD COVD -A: Performed by: ORTHOPAEDIC SURGERY

## 2020-10-12 PROCEDURE — 74011250637 HC RX REV CODE- 250/637: Performed by: NURSE ANESTHETIST, CERTIFIED REGISTERED

## 2020-10-12 PROCEDURE — 74011636637 HC RX REV CODE- 636/637: Performed by: NURSE PRACTITIONER

## 2020-10-12 PROCEDURE — 83036 HEMOGLOBIN GLYCOSYLATED A1C: CPT

## 2020-10-12 PROCEDURE — 2709999900 HC NON-CHARGEABLE SUPPLY: Performed by: ORTHOPAEDIC SURGERY

## 2020-10-12 PROCEDURE — 77030040361 HC SLV COMPR DVT MDII -B: Performed by: ORTHOPAEDIC SURGERY

## 2020-10-12 PROCEDURE — 74011636637 HC RX REV CODE- 636/637: Performed by: NURSE ANESTHETIST, CERTIFIED REGISTERED

## 2020-10-12 PROCEDURE — 77030031139 HC SUT VCRL2 J&J -A: Performed by: ORTHOPAEDIC SURGERY

## 2020-10-12 PROCEDURE — 74011636637 HC RX REV CODE- 636/637: Performed by: ORTHOPAEDIC SURGERY

## 2020-10-12 PROCEDURE — 80048 BASIC METABOLIC PNL TOTAL CA: CPT

## 2020-10-12 PROCEDURE — 77030010512 HC APPL CLP LIG J&J -C: Performed by: ORTHOPAEDIC SURGERY

## 2020-10-12 PROCEDURE — 77010033678 HC OXYGEN DAILY

## 2020-10-12 PROCEDURE — 74011250636 HC RX REV CODE- 250/636: Performed by: NURSE PRACTITIONER

## 2020-10-12 PROCEDURE — 74011250636 HC RX REV CODE- 250/636: Performed by: NURSE ANESTHETIST, CERTIFIED REGISTERED

## 2020-10-12 PROCEDURE — C1713 ANCHOR/SCREW BN/BN,TIS/BN: HCPCS | Performed by: ORTHOPAEDIC SURGERY

## 2020-10-12 PROCEDURE — 74011250637 HC RX REV CODE- 250/637: Performed by: ORTHOPAEDIC SURGERY

## 2020-10-12 PROCEDURE — L0120 CERV FLEX N/ADJ FOAM PRE OTS: HCPCS | Performed by: ORTHOPAEDIC SURGERY

## 2020-10-12 PROCEDURE — 77030012890: Performed by: ORTHOPAEDIC SURGERY

## 2020-10-12 PROCEDURE — 77030029099 HC BN WAX SSPC -A: Performed by: ORTHOPAEDIC SURGERY

## 2020-10-12 PROCEDURE — 77030029372 HC ADH SKN CLSR PRINEO J&J -C: Performed by: ORTHOPAEDIC SURGERY

## 2020-10-12 PROCEDURE — 74011250636 HC RX REV CODE- 250/636

## 2020-10-12 PROCEDURE — 76210000016 HC OR PH I REC 1 TO 1.5 HR: Performed by: ORTHOPAEDIC SURGERY

## 2020-10-12 PROCEDURE — 76010000133 HC OR TIME 3 TO 3.5 HR: Performed by: ORTHOPAEDIC SURGERY

## 2020-10-12 PROCEDURE — 2709999900 HC NON-CHARGEABLE SUPPLY

## 2020-10-12 PROCEDURE — 77030037102 HC SPCR CERV ALLGRFT TRIAD NUVA -G1: Performed by: ORTHOPAEDIC SURGERY

## 2020-10-12 PROCEDURE — 00670 ANES XTNSV SP&SPI CORD PX: CPT | Performed by: NURSE ANESTHETIST, CERTIFIED REGISTERED

## 2020-10-12 PROCEDURE — 74011000258 HC RX REV CODE- 258: Performed by: NURSE ANESTHETIST, CERTIFIED REGISTERED

## 2020-10-12 PROCEDURE — 74011000636 HC RX REV CODE- 636: Performed by: ORTHOPAEDIC SURGERY

## 2020-10-12 PROCEDURE — 36415 COLL VENOUS BLD VENIPUNCTURE: CPT

## 2020-10-12 PROCEDURE — 74011000272 HC RX REV CODE- 272: Performed by: ORTHOPAEDIC SURGERY

## 2020-10-12 PROCEDURE — 97116 GAIT TRAINING THERAPY: CPT

## 2020-10-12 PROCEDURE — 77030027138 HC INCENT SPIROMETER -A

## 2020-10-12 PROCEDURE — 77030013567 HC DRN WND RESERV BARD -A: Performed by: ORTHOPAEDIC SURGERY

## 2020-10-12 PROCEDURE — 74011250636 HC RX REV CODE- 250/636: Performed by: ORTHOPAEDIC SURGERY

## 2020-10-12 PROCEDURE — 77030012406 HC DRN WND PENRS BARD -A: Performed by: ORTHOPAEDIC SURGERY

## 2020-10-12 PROCEDURE — 97161 PT EVAL LOW COMPLEX 20 MIN: CPT

## 2020-10-12 PROCEDURE — 74011000250 HC RX REV CODE- 250: Performed by: NURSE ANESTHETIST, CERTIFIED REGISTERED

## 2020-10-12 PROCEDURE — 77030037441 HC SPCR BN ALLOGRFT NUVA -G: Performed by: ORTHOPAEDIC SURGERY

## 2020-10-12 PROCEDURE — 82962 GLUCOSE BLOOD TEST: CPT

## 2020-10-12 PROCEDURE — 00670 ANES XTNSV SP&SPI CORD PX: CPT | Performed by: ANESTHESIOLOGY

## 2020-10-12 PROCEDURE — 76060000037 HC ANESTHESIA 3 TO 3.5 HR: Performed by: ORTHOPAEDIC SURGERY

## 2020-10-12 PROCEDURE — 74011000250 HC RX REV CODE- 250: Performed by: ORTHOPAEDIC SURGERY

## 2020-10-12 DEVICE — SCREW SPNL L15MM DIA4MM ANTR CERV SELF DRL VAR ANG ARCHON: Type: IMPLANTABLE DEVICE | Site: SPINE CERVICAL | Status: FUNCTIONAL

## 2020-10-12 DEVICE — IMPLANTABLE DEVICE: Type: IMPLANTABLE DEVICE | Site: SPINE CERVICAL | Status: FUNCTIONAL

## 2020-10-12 DEVICE — CAGE SPNL W11XH8XL14MM LUM LORD INTBDY FUS TRIAD CC: Type: IMPLANTABLE DEVICE | Site: SPINE CERVICAL | Status: FUNCTIONAL

## 2020-10-12 RX ORDER — DEXTROSE 50 % IN WATER (D50W) INTRAVENOUS SYRINGE
25-50 AS NEEDED
Status: DISCONTINUED | OUTPATIENT
Start: 2020-10-12 | End: 2020-10-13 | Stop reason: HOSPADM

## 2020-10-12 RX ORDER — DIPHENHYDRAMINE HCL 25 MG
25 CAPSULE ORAL
Status: DISCONTINUED | OUTPATIENT
Start: 2020-10-12 | End: 2020-10-13 | Stop reason: HOSPADM

## 2020-10-12 RX ORDER — FINASTERIDE 5 MG/1
5 TABLET, FILM COATED ORAL DAILY
Status: DISCONTINUED | OUTPATIENT
Start: 2020-10-13 | End: 2020-10-13 | Stop reason: HOSPADM

## 2020-10-12 RX ORDER — VANCOMYCIN HYDROCHLORIDE 1 G/20ML
INJECTION, POWDER, LYOPHILIZED, FOR SOLUTION INTRAVENOUS AS NEEDED
Status: DISCONTINUED | OUTPATIENT
Start: 2020-10-12 | End: 2020-10-12 | Stop reason: HOSPADM

## 2020-10-12 RX ORDER — MAGNESIUM SULFATE 100 %
4 CRYSTALS MISCELLANEOUS AS NEEDED
Status: DISCONTINUED | OUTPATIENT
Start: 2020-10-12 | End: 2020-10-12 | Stop reason: HOSPADM

## 2020-10-12 RX ORDER — FENTANYL CITRATE 50 UG/ML
INJECTION, SOLUTION INTRAMUSCULAR; INTRAVENOUS AS NEEDED
Status: DISCONTINUED | OUTPATIENT
Start: 2020-10-12 | End: 2020-10-12 | Stop reason: HOSPADM

## 2020-10-12 RX ORDER — ACETAMINOPHEN 500 MG
1000 TABLET ORAL EVERY 6 HOURS
Status: DISCONTINUED | OUTPATIENT
Start: 2020-10-12 | End: 2020-10-13 | Stop reason: HOSPADM

## 2020-10-12 RX ORDER — INSULIN LISPRO 100 [IU]/ML
INJECTION, SOLUTION INTRAVENOUS; SUBCUTANEOUS ONCE
Status: COMPLETED | OUTPATIENT
Start: 2020-10-12 | End: 2020-10-12

## 2020-10-12 RX ORDER — DEXTROSE 50 % IN WATER (D50W) INTRAVENOUS SYRINGE
25-50 AS NEEDED
Status: DISCONTINUED | OUTPATIENT
Start: 2020-10-12 | End: 2020-10-12 | Stop reason: HOSPADM

## 2020-10-12 RX ORDER — SUCCINYLCHOLINE CHLORIDE 20 MG/ML
INJECTION INTRAMUSCULAR; INTRAVENOUS AS NEEDED
Status: DISCONTINUED | OUTPATIENT
Start: 2020-10-12 | End: 2020-10-12 | Stop reason: HOSPADM

## 2020-10-12 RX ORDER — SODIUM CHLORIDE 0.9 % (FLUSH) 0.9 %
5-40 SYRINGE (ML) INJECTION EVERY 8 HOURS
Status: DISCONTINUED | OUTPATIENT
Start: 2020-10-12 | End: 2020-10-12 | Stop reason: HOSPADM

## 2020-10-12 RX ORDER — GLYCOPYRROLATE 0.2 MG/ML
INJECTION INTRAMUSCULAR; INTRAVENOUS AS NEEDED
Status: DISCONTINUED | OUTPATIENT
Start: 2020-10-12 | End: 2020-10-12 | Stop reason: HOSPADM

## 2020-10-12 RX ORDER — DIAZEPAM 5 MG/1
5 TABLET ORAL
Status: DISCONTINUED | OUTPATIENT
Start: 2020-10-12 | End: 2020-10-13 | Stop reason: HOSPADM

## 2020-10-12 RX ORDER — ONDANSETRON 2 MG/ML
4 INJECTION INTRAMUSCULAR; INTRAVENOUS ONCE
Status: DISCONTINUED | OUTPATIENT
Start: 2020-10-12 | End: 2020-10-12 | Stop reason: HOSPADM

## 2020-10-12 RX ORDER — TAMSULOSIN HYDROCHLORIDE 0.4 MG/1
0.4 CAPSULE ORAL
Status: DISCONTINUED | OUTPATIENT
Start: 2020-10-12 | End: 2020-10-13 | Stop reason: HOSPADM

## 2020-10-12 RX ORDER — PROPOFOL 10 MG/ML
INJECTION, EMULSION INTRAVENOUS AS NEEDED
Status: DISCONTINUED | OUTPATIENT
Start: 2020-10-12 | End: 2020-10-12 | Stop reason: HOSPADM

## 2020-10-12 RX ORDER — METFORMIN HYDROCHLORIDE 500 MG/1
500 TABLET ORAL
Status: DISCONTINUED | OUTPATIENT
Start: 2020-10-13 | End: 2020-10-12

## 2020-10-12 RX ORDER — FENTANYL CITRATE 50 UG/ML
50 INJECTION, SOLUTION INTRAMUSCULAR; INTRAVENOUS AS NEEDED
Status: DISCONTINUED | OUTPATIENT
Start: 2020-10-12 | End: 2020-10-12 | Stop reason: HOSPADM

## 2020-10-12 RX ORDER — CEFAZOLIN SODIUM 2 G/50ML
2 SOLUTION INTRAVENOUS ONCE
Status: COMPLETED | OUTPATIENT
Start: 2020-10-12 | End: 2020-10-12

## 2020-10-12 RX ORDER — INSULIN LISPRO 100 [IU]/ML
INJECTION, SOLUTION INTRAVENOUS; SUBCUTANEOUS EVERY 6 HOURS
Status: DISCONTINUED | OUTPATIENT
Start: 2020-10-12 | End: 2020-10-12

## 2020-10-12 RX ORDER — MIDAZOLAM HYDROCHLORIDE 1 MG/ML
INJECTION, SOLUTION INTRAMUSCULAR; INTRAVENOUS AS NEEDED
Status: DISCONTINUED | OUTPATIENT
Start: 2020-10-12 | End: 2020-10-12 | Stop reason: HOSPADM

## 2020-10-12 RX ORDER — SODIUM CHLORIDE 0.9 % (FLUSH) 0.9 %
5-40 SYRINGE (ML) INJECTION EVERY 8 HOURS
Status: DISCONTINUED | OUTPATIENT
Start: 2020-10-12 | End: 2020-10-13 | Stop reason: HOSPADM

## 2020-10-12 RX ORDER — HYDROMORPHONE HYDROCHLORIDE 1 MG/ML
1 INJECTION, SOLUTION INTRAMUSCULAR; INTRAVENOUS; SUBCUTANEOUS
Status: DISCONTINUED | OUTPATIENT
Start: 2020-10-12 | End: 2020-10-13 | Stop reason: HOSPADM

## 2020-10-12 RX ORDER — SODIUM CHLORIDE, SODIUM LACTATE, POTASSIUM CHLORIDE, CALCIUM CHLORIDE 600; 310; 30; 20 MG/100ML; MG/100ML; MG/100ML; MG/100ML
75 INJECTION, SOLUTION INTRAVENOUS CONTINUOUS
Status: DISCONTINUED | OUTPATIENT
Start: 2020-10-12 | End: 2020-10-12 | Stop reason: HOSPADM

## 2020-10-12 RX ORDER — MAGNESIUM SULFATE 100 %
4 CRYSTALS MISCELLANEOUS AS NEEDED
Status: DISCONTINUED | OUTPATIENT
Start: 2020-10-12 | End: 2020-10-13 | Stop reason: HOSPADM

## 2020-10-12 RX ORDER — LIDOCAINE HYDROCHLORIDE 20 MG/ML
INJECTION, SOLUTION EPIDURAL; INFILTRATION; INTRACAUDAL; PERINEURAL AS NEEDED
Status: DISCONTINUED | OUTPATIENT
Start: 2020-10-12 | End: 2020-10-12 | Stop reason: HOSPADM

## 2020-10-12 RX ORDER — SODIUM CHLORIDE 0.9 % (FLUSH) 0.9 %
5-40 SYRINGE (ML) INJECTION AS NEEDED
Status: DISCONTINUED | OUTPATIENT
Start: 2020-10-12 | End: 2020-10-12 | Stop reason: HOSPADM

## 2020-10-12 RX ORDER — FAMOTIDINE 20 MG/1
20 TABLET, FILM COATED ORAL ONCE
Status: COMPLETED | OUTPATIENT
Start: 2020-10-12 | End: 2020-10-12

## 2020-10-12 RX ORDER — HYDROMORPHONE HYDROCHLORIDE 2 MG/ML
0.5 INJECTION, SOLUTION INTRAMUSCULAR; INTRAVENOUS; SUBCUTANEOUS
Status: COMPLETED | OUTPATIENT
Start: 2020-10-12 | End: 2020-10-12

## 2020-10-12 RX ORDER — SODIUM CHLORIDE 0.9 % (FLUSH) 0.9 %
5-40 SYRINGE (ML) INJECTION AS NEEDED
Status: DISCONTINUED | OUTPATIENT
Start: 2020-10-12 | End: 2020-10-13 | Stop reason: HOSPADM

## 2020-10-12 RX ORDER — DEXAMETHASONE SODIUM PHOSPHATE 4 MG/ML
INJECTION, SOLUTION INTRA-ARTICULAR; INTRALESIONAL; INTRAMUSCULAR; INTRAVENOUS; SOFT TISSUE AS NEEDED
Status: DISCONTINUED | OUTPATIENT
Start: 2020-10-12 | End: 2020-10-12 | Stop reason: HOSPADM

## 2020-10-12 RX ORDER — TELMISARTAN 40 MG/1
80 TABLET ORAL DAILY
Status: DISCONTINUED | OUTPATIENT
Start: 2020-10-13 | End: 2020-10-13 | Stop reason: HOSPADM

## 2020-10-12 RX ORDER — DEXTROSE, SODIUM CHLORIDE, AND POTASSIUM CHLORIDE 5; .45; .15 G/100ML; G/100ML; G/100ML
25 INJECTION INTRAVENOUS CONTINUOUS
Status: DISCONTINUED | OUTPATIENT
Start: 2020-10-12 | End: 2020-10-13 | Stop reason: HOSPADM

## 2020-10-12 RX ORDER — ONDANSETRON 2 MG/ML
INJECTION INTRAMUSCULAR; INTRAVENOUS AS NEEDED
Status: DISCONTINUED | OUTPATIENT
Start: 2020-10-12 | End: 2020-10-12 | Stop reason: HOSPADM

## 2020-10-12 RX ORDER — DIPHENHYDRAMINE HYDROCHLORIDE 50 MG/ML
12.5 INJECTION, SOLUTION INTRAMUSCULAR; INTRAVENOUS
Status: DISCONTINUED | OUTPATIENT
Start: 2020-10-12 | End: 2020-10-13 | Stop reason: HOSPADM

## 2020-10-12 RX ORDER — HYDROCHLOROTHIAZIDE 25 MG/1
25 TABLET ORAL DAILY
Status: DISCONTINUED | OUTPATIENT
Start: 2020-10-13 | End: 2020-10-13 | Stop reason: HOSPADM

## 2020-10-12 RX ORDER — SODIUM CHLORIDE, SODIUM LACTATE, POTASSIUM CHLORIDE, CALCIUM CHLORIDE 600; 310; 30; 20 MG/100ML; MG/100ML; MG/100ML; MG/100ML
50 INJECTION, SOLUTION INTRAVENOUS CONTINUOUS
Status: DISCONTINUED | OUTPATIENT
Start: 2020-10-12 | End: 2020-10-12 | Stop reason: HOSPADM

## 2020-10-12 RX ORDER — VECURONIUM BROMIDE FOR INJECTION 1 MG/ML
INJECTION, POWDER, LYOPHILIZED, FOR SOLUTION INTRAVENOUS AS NEEDED
Status: DISCONTINUED | OUTPATIENT
Start: 2020-10-12 | End: 2020-10-12 | Stop reason: HOSPADM

## 2020-10-12 RX ORDER — NALOXONE HYDROCHLORIDE 0.4 MG/ML
0.4 INJECTION, SOLUTION INTRAMUSCULAR; INTRAVENOUS; SUBCUTANEOUS AS NEEDED
Status: DISCONTINUED | OUTPATIENT
Start: 2020-10-12 | End: 2020-10-13 | Stop reason: HOSPADM

## 2020-10-12 RX ORDER — OXYCODONE HYDROCHLORIDE 5 MG/1
5-10 TABLET ORAL
Status: DISCONTINUED | OUTPATIENT
Start: 2020-10-12 | End: 2020-10-13 | Stop reason: HOSPADM

## 2020-10-12 RX ORDER — BACLOFEN 10 MG/1
10 TABLET ORAL 3 TIMES DAILY
Status: DISCONTINUED | OUTPATIENT
Start: 2020-10-12 | End: 2020-10-13 | Stop reason: HOSPADM

## 2020-10-12 RX ORDER — NEOSTIGMINE METHYLSULFATE 1 MG/ML
INJECTION, SOLUTION INTRAVENOUS AS NEEDED
Status: DISCONTINUED | OUTPATIENT
Start: 2020-10-12 | End: 2020-10-12 | Stop reason: HOSPADM

## 2020-10-12 RX ORDER — INSULIN LISPRO 100 [IU]/ML
INJECTION, SOLUTION INTRAVENOUS; SUBCUTANEOUS
Status: DISCONTINUED | OUTPATIENT
Start: 2020-10-12 | End: 2020-10-13 | Stop reason: HOSPADM

## 2020-10-12 RX ORDER — GLIMEPIRIDE 2 MG/1
2 TABLET ORAL
Status: DISCONTINUED | OUTPATIENT
Start: 2020-10-13 | End: 2020-10-12

## 2020-10-12 RX ORDER — LORAZEPAM 2 MG/ML
1 INJECTION INTRAMUSCULAR
Status: DISCONTINUED | OUTPATIENT
Start: 2020-10-12 | End: 2020-10-13 | Stop reason: HOSPADM

## 2020-10-12 RX ORDER — GABAPENTIN 300 MG/1
300 CAPSULE ORAL 3 TIMES DAILY
Status: DISCONTINUED | OUTPATIENT
Start: 2020-10-12 | End: 2020-10-13 | Stop reason: HOSPADM

## 2020-10-12 RX ORDER — AMLODIPINE BESYLATE 5 MG/1
5 TABLET ORAL DAILY
Status: DISCONTINUED | OUTPATIENT
Start: 2020-10-13 | End: 2020-10-13 | Stop reason: HOSPADM

## 2020-10-12 RX ORDER — ONDANSETRON 2 MG/ML
4 INJECTION INTRAMUSCULAR; INTRAVENOUS
Status: DISCONTINUED | OUTPATIENT
Start: 2020-10-12 | End: 2020-10-13 | Stop reason: HOSPADM

## 2020-10-12 RX ADMIN — HYDROMORPHONE HYDROCHLORIDE 0.5 MG: 2 INJECTION, SOLUTION INTRAMUSCULAR; INTRAVENOUS; SUBCUTANEOUS at 11:30

## 2020-10-12 RX ADMIN — HYDROMORPHONE HYDROCHLORIDE 0.5 MG: 2 INJECTION, SOLUTION INTRAMUSCULAR; INTRAVENOUS; SUBCUTANEOUS at 11:20

## 2020-10-12 RX ADMIN — GLYCOPYRROLATE 0.4 MG: 0.2 INJECTION INTRAMUSCULAR; INTRAVENOUS at 09:58

## 2020-10-12 RX ADMIN — INSULIN LISPRO 3 UNITS: 100 INJECTION, SOLUTION INTRAVENOUS; SUBCUTANEOUS at 21:12

## 2020-10-12 RX ADMIN — VECURONIUM BROMIDE 1 MG: 1 INJECTION, POWDER, LYOPHILIZED, FOR SOLUTION INTRAVENOUS at 09:35

## 2020-10-12 RX ADMIN — DEXAMETHASONE SODIUM PHOSPHATE 8 MG: 4 INJECTION, SOLUTION INTRAMUSCULAR; INTRAVENOUS at 07:52

## 2020-10-12 RX ADMIN — PROPOFOL 200 MG: 10 INJECTION, EMULSION INTRAVENOUS at 07:41

## 2020-10-12 RX ADMIN — Medication 10 ML: at 21:16

## 2020-10-12 RX ADMIN — VECURONIUM BROMIDE 1 MG: 1 INJECTION, POWDER, LYOPHILIZED, FOR SOLUTION INTRAVENOUS at 09:13

## 2020-10-12 RX ADMIN — GABAPENTIN 300 MG: 300 CAPSULE ORAL at 15:54

## 2020-10-12 RX ADMIN — LIDOCAINE HYDROCHLORIDE 80 MG: 20 INJECTION, SOLUTION EPIDURAL; INFILTRATION; INTRACAUDAL; PERINEURAL at 10:07

## 2020-10-12 RX ADMIN — FENTANYL CITRATE 50 MCG: 50 INJECTION, SOLUTION INTRAMUSCULAR; INTRAVENOUS at 09:13

## 2020-10-12 RX ADMIN — FAMOTIDINE 20 MG: 20 TABLET ORAL at 06:53

## 2020-10-12 RX ADMIN — ACETAMINOPHEN 1000 MG: 500 TABLET ORAL at 17:49

## 2020-10-12 RX ADMIN — BACLOFEN 10 MG: 10 TABLET ORAL at 21:14

## 2020-10-12 RX ADMIN — BACLOFEN 10 MG: 10 TABLET ORAL at 15:54

## 2020-10-12 RX ADMIN — ACETAMINOPHEN 1000 MG: 500 TABLET ORAL at 13:36

## 2020-10-12 RX ADMIN — TAMSULOSIN HYDROCHLORIDE 0.4 MG: 0.4 CAPSULE ORAL at 17:49

## 2020-10-12 RX ADMIN — SODIUM CHLORIDE 1000 MG: 900 INJECTION, SOLUTION INTRAVENOUS at 15:54

## 2020-10-12 RX ADMIN — SUCCINYLCHOLINE CHLORIDE 120 MG: 20 INJECTION, SOLUTION INTRAMUSCULAR; INTRAVENOUS at 07:41

## 2020-10-12 RX ADMIN — VECURONIUM BROMIDE 2 MG: 1 INJECTION, POWDER, LYOPHILIZED, FOR SOLUTION INTRAVENOUS at 07:57

## 2020-10-12 RX ADMIN — ONDANSETRON 4 MG: 2 INJECTION INTRAMUSCULAR; INTRAVENOUS at 09:57

## 2020-10-12 RX ADMIN — HYDROMORPHONE HYDROCHLORIDE 0.5 MG: 2 INJECTION, SOLUTION INTRAMUSCULAR; INTRAVENOUS; SUBCUTANEOUS at 11:00

## 2020-10-12 RX ADMIN — CEFAZOLIN SODIUM 2 G: 2 SOLUTION INTRAVENOUS at 07:50

## 2020-10-12 RX ADMIN — SODIUM CHLORIDE, SODIUM LACTATE, POTASSIUM CHLORIDE, AND CALCIUM CHLORIDE 75 ML/HR: 600; 310; 30; 20 INJECTION, SOLUTION INTRAVENOUS at 06:36

## 2020-10-12 RX ADMIN — VECURONIUM BROMIDE 3 MG: 1 INJECTION, POWDER, LYOPHILIZED, FOR SOLUTION INTRAVENOUS at 08:29

## 2020-10-12 RX ADMIN — GABAPENTIN 300 MG: 300 CAPSULE ORAL at 21:14

## 2020-10-12 RX ADMIN — FENTANYL CITRATE 50 MCG: 50 INJECTION, SOLUTION INTRAMUSCULAR; INTRAVENOUS at 08:13

## 2020-10-12 RX ADMIN — Medication 10 ML: at 13:47

## 2020-10-12 RX ADMIN — INSULIN LISPRO 6 UNITS: 100 INJECTION, SOLUTION INTRAVENOUS; SUBCUTANEOUS at 11:12

## 2020-10-12 RX ADMIN — SODIUM CHLORIDE, SODIUM LACTATE, POTASSIUM CHLORIDE, AND CALCIUM CHLORIDE: 600; 310; 30; 20 INJECTION, SOLUTION INTRAVENOUS at 08:05

## 2020-10-12 RX ADMIN — Medication 3 MG: at 09:58

## 2020-10-12 RX ADMIN — OXYCODONE 10 MG: 5 TABLET ORAL at 13:36

## 2020-10-12 RX ADMIN — INSULIN LISPRO 4 UNITS: 100 INJECTION, SOLUTION INTRAVENOUS; SUBCUTANEOUS at 16:06

## 2020-10-12 RX ADMIN — LIDOCAINE HYDROCHLORIDE 100 MG: 20 INJECTION, SOLUTION EPIDURAL; INFILTRATION; INTRACAUDAL; PERINEURAL at 07:41

## 2020-10-12 RX ADMIN — OXYCODONE 10 MG: 5 TABLET ORAL at 17:49

## 2020-10-12 RX ADMIN — SODIUM CHLORIDE 1 G: 900 INJECTION, SOLUTION INTRAVENOUS at 08:02

## 2020-10-12 RX ADMIN — PHENYLEPHRINE HYDROCHLORIDE 150 MCG: 10 INJECTION INTRAVENOUS at 08:48

## 2020-10-12 RX ADMIN — VECURONIUM BROMIDE 3 MG: 1 INJECTION, POWDER, LYOPHILIZED, FOR SOLUTION INTRAVENOUS at 07:46

## 2020-10-12 RX ADMIN — INSULIN LISPRO 3 UNITS: 100 INJECTION, SOLUTION INTRAVENOUS; SUBCUTANEOUS at 07:07

## 2020-10-12 RX ADMIN — MIDAZOLAM HYDROCHLORIDE 2 MG: 2 INJECTION, SOLUTION INTRAMUSCULAR; INTRAVENOUS at 07:25

## 2020-10-12 RX ADMIN — FENTANYL CITRATE 150 MCG: 50 INJECTION, SOLUTION INTRAMUSCULAR; INTRAVENOUS at 07:40

## 2020-10-12 RX ADMIN — POTASSIUM CHLORIDE, DEXTROSE MONOHYDRATE AND SODIUM CHLORIDE 25 ML/HR: 150; 5; 450 INJECTION, SOLUTION INTRAVENOUS at 13:36

## 2020-10-12 RX ADMIN — HYDROMORPHONE HYDROCHLORIDE 0.5 MG: 2 INJECTION, SOLUTION INTRAMUSCULAR; INTRAVENOUS; SUBCUTANEOUS at 11:10

## 2020-10-12 NOTE — PROGRESS NOTES
Problem: Mobility Impaired (Adult and Pediatric)  Goal: *Acute Goals and Plan of Care (Insert Text)  Outcome: Resolved/Met     PHYSICAL THERAPY EVALUATION AND DISCHARGE    Patient: Ranjan Franz (12 y.o. male)  Date: 10/12/2020  Primary Diagnosis: Cervical spondylosis with myelopathy [M47.12]  Cervical myelopathy (HCC) [G95.9]  Procedure(s) (LRB):  ANTERIOR CERVICAL DISCECTOMY WITH FUSION C4/5 C5/6 C6/7; C-ARM/NUVASIVE (N/A) Day of Surgery   Precautions:   Fall(cervical) Contact    PLOF: Independent with mobility including gait no AD. ASSESSMENT :  PT orders received and patient cleared by nursing to participate with therapy. Patient is a 61 y.o. male admitted to the hospital due to s/p ACDF C4-7 Dr. Estrada Bueno 10/12/2020. Patient consents to PT evaluation and treatment. Pt educated on safety, mobility, therex, cervical spine precautions, and OOB 3-5 times with nursing assistance. Bed mobility modified independent. Sit to stands supervision. Gait in room supervision no AD. Pt has decreased pace but no gross abnormalities. Pt ended therapy sitting in recliner with all needs met. Patient does not require further skilled physical therapy at this level of care. Cleared by PT for d/c home, nursing informed. PLAN :  Recommendations and Planned Interventions:   No skilled inpatient physical therapy needs identified at this time. Discharge Recommendations: Home Health  Further Equipment Recommendations for Discharge: N/A     SUBJECTIVE:   Patient stated Adelaide Zaragoza had my knee replaced in February.     OBJECTIVE DATA SUMMARY:     Past Medical History:   Diagnosis Date    Chronic low back pain     Degeneration of lumbar intervertebral disc     Diabetes ([facility] [address]     Diabetes mellitus ([facility] [address]     Essential hypertension     Hyperlipidemia     Hypertension     MRSA colonization 01/23/2020    nasal swab-pt was treated with Mupirocin    Shoulder pain      Past Surgical History:   Procedure Laterality Date    HX COLONOSCOPY HX HERNIA REPAIR      HX OTHER SURGICAL Right 2005    knee surgery ACL    HX OTHER SURGICAL Right 02/2020    right knee replacement     Barriers to Learning/Limitations: None  Compensate with: N/A  Home Situation:   Home Situation  Home Environment: Private residence  # Steps to Enter: 0  One/Two Story Residence: One story  Living Alone: No  Support Systems: Spouse/Significant Other/Partner  Patient Expects to be Discharged to[de-identified] Private residence  Current DME Used/Available at Home: [employer], rolling, [address], [place], straight  Critical Behavior:  Neurologic State: Alert  Orientation Level: Oriented X4  Cognition: Follows commands  Safety/Judgement: Fall prevention  Psychosocial  Patient Behaviors: Calm; Cooperative                 B LE Strength:    Strength: Within functional limits              B LE Tone & Sensation:   Tone: Normal          Sensation: Intact           B LE Range Of Motion:  AROM: Within functional limits                 Posture:  Posture (WDL): Exceptions to WDL  Posture Assessment: Rounded shoulders  Functional Mobility:  Bed Mobility:     Supine to Sit: Modified independent     Scooting: Modified independent  Transfers:  Sit to Stand: Supervision  Stand to Sit: Supervision                       Balance:   Sitting: Intact  Standing: Intact    Ambulation/Gait Training:  Distance (ft): 75 Feet (ft)  Assistive Device: (no AD)  Ambulation - Level of Assistance: Supervision  Gait Abnormalities: (no gross abnormalites)       Therapeutic Exercises:   Reviewed and performed ankle pumps to increase blood flow and circulation. Pain:  Pain level pre-treatment: 6/10 neck  Pain level post-treatment: same/10   Pain Intervention(s): Medication (see MAR); Rest, Repositioning   Response to intervention: Nurse notified, See doc flow    Activity Tolerance:   good  Please refer to the flowsheet for vital signs taken during this treatment.     After treatment:   [x]         Patient left in no apparent distress sitting up in chair  []         Patient left in no apparent distress in bed  [x]         Call bell left within reach  [x]   Personal items in reach  [x]         Nursing notified Debbie Cook  []         Caregiver present  []         Bed/chair alarm activated  [x]         SCDs applied       COMMUNICATION/EDUCATION:   [x]         Role of Physical Therapy in the acute care setting. [x]         Fall prevention education was provided and the patient/caregiver indicated understanding. [x]         Patient/family have participated as able in goal setting and plan of care. [x]         Patient/family agree to work toward stated goals and plan of care. []         Patient understands intent and goals of therapy, but is neutral about his/her participation. []         Patient is unable to participate in goal setting/plan of care: ongoing with therapy staff. [x]         Out of bed with nursing assistance 3-5 times a day. []         Other:     Thank you for this referral.  Alec Godoy, PT, DPT   Time Calculation: 24 mins      Eval Complexity: History: MEDIUM  Complexity : 1-2 comorbidities / personal factors will impact the outcome/ POC Exam:HIGH Complexity : 4+ Standardized tests and measures addressing body structure, function, activity limitation and / or participation in recreation  Presentation: LOW Complexity : Stable, uncomplicated  Clinical Decision Making:Low Complexity    Overall Complexity:LOW

## 2020-10-12 NOTE — INTERVAL H&P NOTE
Update History & Physical 
 
The Patient's History and Physical of October 11, 2020 was reviewed with the patient and I examined the patient. There was no change. The surgical site was confirmed by the patient and me. Plan:  The risk, benefits, expected outcome, and alternative to the recommended procedure have been discussed with the patient. Patient understands and wants to proceed with the procedure.  
 
Electronically signed by Socorro Sims MD on 10/12/2020 at 6:15 AM

## 2020-10-12 NOTE — PROGRESS NOTES
OR Today  ANTERIOR CERVICAL DISCECTOMY WITH FUSION C4/5 C5/6 C6/7  VSS  MARITZA = 55 cc  Urine output = 700 cc  Swallowing well  Ambulated 75 feet with PT  Up in recliner  DM orders done  States left arm pain improved  5/5 BUE  Neuro intact    Anjelica Nieves, NP

## 2020-10-12 NOTE — BRIEF OP NOTE
Brief Postoperative Note    Patient: Clay Hinton  YOB: 1960  MRN: [id number]    Date of Procedure: 10/12/2020     Pre-Op Diagnosis: Cervical spondylosis with myelopathy [M47.12]    Post-Op Diagnosis: Same as preoperative diagnosis. Procedure(s):  ANTERIOR CERVICAL DISCECTOMY WITH FUSION C4/5 C5/6 C6/7; C-ARM/NUVASIVE    Surgeon(s):  Imelda Bender MD    Surgical Assistant: None    Anesthesia: General     Estimated Blood Loss (mL): Minimal    Complications: None    Specimens: * No specimens in log *     Implants:   Implant Name Type Inv.  Item Serial No.  Lot No. LRB No. Used Action   CAGE SPNL [id number] LUM LORD INTBDY FUS TRIAD CC - [id number]  CAGE SPNL [id number] LUM LORD INTBDY FUS TRIAD CC [id number] [employer]  N/A 1 Implanted   GRAFT ALLGRFT ID [id number] ID RNG ASMITA CR [id number] TRIAD - [id number]  GRAFT ALLGRFT ID [id number] ID RNG ASMITA CR [id number] TRIAD [id number] [employer]  N/A 1 Implanted   GRAFT ALLGRFT ID [id number] ID RNG ASMITA CR [id number] TRIAD - [id number]  GRAFT ALLGRFT ID [id number] ID RNG ASMITA CR [id number] TRIAD [id number] [employer]  N/A 1 Implanted   PLATE SPNL B51DS ANTR CERV 3 LEV ARCHON - [id number]  PLATE SPNL C61VM ANTR CERV 3 LEV ARCHON  [employer]WD NA N/A 1 Implanted   SCREW SPNL L15MM DIA4MM ANTR CERV SELF DRL EARL ANG ARCHON - [id number]  SCREW SPNL L15MM DIA4MM ANTR CERV SELF DRL EARL ANG ARCHON  [employer]WD NA N/A 8 Implanted       Drains:   Percy-Jackson Drain 10/12/20 Anterior Neck (Active)       Findings: severe stenosis, partially ossified ligament    Electronically Signed by Renate Vogt MD on 10/12/2020 at 10:07 AM

## 2020-10-12 NOTE — PERIOP NOTES
TRANSFER - OUT REPORT:    Verbal report given to [facility] RN on Rachid Bard  being transferred to  [phone] for routine post - op       Report consisted of patients Situation, Background, Assessment and   Recommendations(SBAR). Information from the following report(s) SBAR, MAR and Recent Results was reviewed with the receiving nurse. Lines:   Peripheral IV 10/12/20 Right Forearm (Active)   Site Assessment Clean, dry, & intact 10/12/20 1027   Phlebitis Assessment 0 10/12/20 1027   Infiltration Assessment 0 10/12/20 1027   Dressing Status Clean, dry, & intact 10/12/20 1027   Dressing Type Transparent;Tape 10/12/20 1027   Hub Color/Line Status Pink 10/12/20 1027       Peripheral IV 10/12/20 Left Forearm (Active)   Site Assessment Clean, dry, & intact 10/12/20 1027   Phlebitis Assessment 0 10/12/20 1027   Infiltration Assessment 0 10/12/20 1027   Dressing Status Clean, dry, & intact 10/12/20 1027   Dressing Type Transparent;Tape 10/12/20 1027   Hub Color/Line Status Pink 10/12/20 1027        Opportunity for questions and clarification was provided.       Patient transported with:   O2 @ 3 liters  Tech

## 2020-10-12 NOTE — ANESTHESIA PREPROCEDURE EVALUATION
Relevant Problems   No relevant active problems       Anesthetic History   No history of anesthetic complications            Review of Systems / Medical History  Patient summary reviewed and pertinent labs reviewed    Pulmonary  Within defined limits                 Neuro/Psych   Within defined limits           Cardiovascular    Hypertension                   GI/Hepatic/Renal                Endo/Other    Diabetes    Arthritis     Other Findings              Physical Exam    Airway  Mallampati: II  TM Distance: > 6 cm  Neck ROM: decreased range of motion, short neck   Mouth opening: Normal     Cardiovascular  Regular rate and rhythm,  S1 and S2 normal,  no murmur, click, rub, or gallop             Dental  No notable dental hx       Pulmonary  Breath sounds clear to auscultation               Abdominal  GI exam deferred       Other Findings            Anesthetic Plan    ASA: 3  Anesthesia type: general          Induction: Intravenous  Anesthetic plan and risks discussed with: Patient

## 2020-10-12 NOTE — ANESTHESIA POSTPROCEDURE EVALUATION
Procedure(s):  ANTERIOR CERVICAL DISCECTOMY WITH FUSION C4/5 C5/6 C6/7; C-ARM/NUVASIVE. general    Anesthesia Post Evaluation      Multimodal analgesia: multimodal analgesia used between 6 hours prior to anesthesia start to PACU discharge  Patient location during evaluation: PACU  Patient participation: complete - patient participated  Level of consciousness: awake and alert  Pain management: adequate  Airway patency: patent  Anesthetic complications: no  Cardiovascular status: acceptable  Respiratory status: acceptable  Hydration status: acceptable  Post anesthesia nausea and vomiting:  controlled  Final Post Anesthesia Temperature Assessment:  Normothermia (36.0-37.5 degrees C)      INITIAL Post-op Vital signs:   Vitals Value Taken Time   /83 10/12/2020 11:46 AM   Temp 37.2 °C (98.9 °F) 10/12/2020 11:46 AM   Pulse 97 10/12/2020 11:49 AM   Resp 14 10/12/2020 11:49 AM   SpO2 93 % 10/12/2020 11:49 AM   Vitals shown include unvalidated device data.

## 2020-10-12 NOTE — ROUTINE PROCESS
Received report from [facility]. Pt AAOx3, NAD, breathing non labored, on room air, up in the recliner. IV site clean, dry and intact. IVF going per order. Soft cervical collar in place. MARITZA drain frontal neck in place. Bed at the lowest level on lock position, call bell w/i reach. Bedside and Verbal shift change report given to ELLIOT Manzano (oncoming nurse) by me (offgoing nurse). Report included the following information SBAR, Kardex, Procedure Summary, Intake/Output, MAR and Recent Results.

## 2020-10-13 ENCOUNTER — HOME HEALTH ADMISSION (OUTPATIENT)
Dept: HOME HEALTH SERVICES | Facility: HOME HEALTH | Age: 60
End: 2020-10-13
Payer: COMMERCIAL

## 2020-10-13 VITALS
TEMPERATURE: 97.4 F | BODY MASS INDEX: 31.71 KG/M2 | RESPIRATION RATE: 18 BRPM | OXYGEN SATURATION: 93 % | DIASTOLIC BLOOD PRESSURE: 83 MMHG | SYSTOLIC BLOOD PRESSURE: 138 MMHG | HEART RATE: 90 BPM | HEIGHT: 75 IN | WEIGHT: 255 LBS

## 2020-10-13 LAB — GLUCOSE BLD STRIP.AUTO-MCNC: 160 MG/DL (ref 70–110)

## 2020-10-13 PROCEDURE — 2709999900 HC NON-CHARGEABLE SUPPLY

## 2020-10-13 PROCEDURE — 99218 HC RM OBSERVATION: CPT

## 2020-10-13 PROCEDURE — 82962 GLUCOSE BLOOD TEST: CPT

## 2020-10-13 PROCEDURE — 97535 SELF CARE MNGMENT TRAINING: CPT

## 2020-10-13 PROCEDURE — 74011636637 HC RX REV CODE- 636/637: Performed by: ORTHOPAEDIC SURGERY

## 2020-10-13 PROCEDURE — 74011250636 HC RX REV CODE- 250/636: Performed by: ORTHOPAEDIC SURGERY

## 2020-10-13 PROCEDURE — 97165 OT EVAL LOW COMPLEX 30 MIN: CPT

## 2020-10-13 PROCEDURE — 74011250637 HC RX REV CODE- 250/637: Performed by: ORTHOPAEDIC SURGERY

## 2020-10-13 RX ORDER — OXYCODONE HYDROCHLORIDE 5 MG/1
5 TABLET ORAL
Qty: 28 TAB | Refills: 0 | Status: SHIPPED | OUTPATIENT
Start: 2020-10-13 | End: 2020-10-20

## 2020-10-13 RX ADMIN — FINASTERIDE 5 MG: 5 TABLET, FILM COATED ORAL at 09:43

## 2020-10-13 RX ADMIN — GABAPENTIN 300 MG: 300 CAPSULE ORAL at 09:41

## 2020-10-13 RX ADMIN — ACETAMINOPHEN 1000 MG: 500 TABLET ORAL at 06:44

## 2020-10-13 RX ADMIN — BACLOFEN 10 MG: 10 TABLET ORAL at 09:43

## 2020-10-13 RX ADMIN — OXYCODONE 10 MG: 5 TABLET ORAL at 09:42

## 2020-10-13 RX ADMIN — SODIUM CHLORIDE 1000 MG: 900 INJECTION, SOLUTION INTRAVENOUS at 04:29

## 2020-10-13 RX ADMIN — HYDROCHLOROTHIAZIDE 25 MG: 25 TABLET ORAL at 09:42

## 2020-10-13 RX ADMIN — TELMISARTAN 80 MG: 40 TABLET ORAL at 09:41

## 2020-10-13 RX ADMIN — AMLODIPINE BESYLATE 5 MG: 5 TABLET ORAL at 09:42

## 2020-10-13 RX ADMIN — INSULIN LISPRO 3 UNITS: 100 INJECTION, SOLUTION INTRAVENOUS; SUBCUTANEOUS at 06:37

## 2020-10-13 NOTE — ROUTINE PROCESS
Bedside shift change report given to Lorena Thompson RN (oncoming nurse) by Darrell Diaz RN (offgoing nurse). Report included the following information SBAR, Kardex, Procedure Summary, Intake/Output, MAR and Recent Results.

## 2020-10-13 NOTE — PROGRESS NOTES
OT order received and chart reviewed. Patient was seen for skilled OT and is safe for d/c home when medically stable. Full note to follow.          Thank you for this referral,  Mariama Moctezuma MS, OTR/L

## 2020-10-13 NOTE — PROGRESS NOTES
Problem: Self Care Deficits Care Plan (Adult)  Goal: *Acute Goals and Plan of Care (Insert Text)  Outcome: Resolved/Met       OCCUPATIONAL THERAPY EVALUATION/DISCHARGE    Patient: Devan Lopez (89 y.o. male)  Date: 10/13/2020  Primary Diagnosis: Cervical spondylosis with myelopathy [M47.12]  Cervical myelopathy ([facility] [address] [G95.9]  Procedure(s) (LRB):  ANTERIOR CERVICAL DISCECTOMY WITH FUSION C4/5 C5/6 C6/7; C-ARM/NUVASIVE (N/A) 1 Day Post-Op   Precautions:Fall(cervical)  PLOF: Patient was independent with self-care and functional mobility PTA. ASSESSMENT AND RECOMMENDATIONS:  Patient cleared to participate in OT evaluation by RN. Upon entering the room, patient was seated in chair, alert, and agreeable to participate in OT evaluation with RN briefly present. Cervical spine precautions reviewed and patient verbalized understanding. Handout given of precautions and patient able to correctly identify 3/3 with 100% accuracy post- session. Based on the objective data below, patient is able to perform basic self care tasks without assistance while seated and in standing. During evaluation, patient completed functional transfers with modified independence, functional mobility with independence and upper and lower body dressing independently. Patient has a supportive wife at home to assist prn and all needed DME. At this time, no further skilled OT needed. OT to d/c from caseload. Skilled occupational therapy is not indicated at this time.   Discharge Recommendations: None  Further Equipment Recommendations for Discharge: N/A      SUBJECTIVE:   Patient stated no one told me any precautions    OBJECTIVE DATA SUMMARY:     Past Medical History:   Diagnosis Date    Chronic low back pain     Degeneration of lumbar intervertebral disc     Diabetes ([facility] [address]     Diabetes mellitus ([facility] [address]     Essential hypertension     Hyperlipidemia     Hypertension     MRSA colonization 01/23/2020    nasal swab-pt was treated with Mupirocin Shoulder pain      Past Surgical History:   Procedure Laterality Date    HX COLONOSCOPY      HX HERNIA REPAIR      HX OTHER SURGICAL Right 2005    knee surgery ACL    HX OTHER SURGICAL Right 02/2020    right knee replacement     Barriers to Learning/Limitations: None  Compensate with: visual, verbal, tactile, kinesthetic cues/model    Home Situation:   Home Situation  Home Environment: (condo)  # Steps to Enter: 0  One/Two Story Residence: Other (Comment)(Four story with elevator)  Living Alone: No  Support Systems: Child(jonh), Family member(s), Friends \ neighbors, Spouse/Significant Other/Partner  Patient Expects to be Discharged to[de-identified] Apartment  Current DME Used/Available at Home: Cane, straight, Walker  Tub or Shower Type: Shower  [x]     Right hand dominant   []     Left hand dominant    Cognitive/Behavioral Status:  Neurologic State: Alert  Orientation Level: Oriented X4  Cognition: Follows commands  Safety/Judgement: Fall prevention    Skin: Intact  Edema: None noted    Vision/Perceptual:    Acuity: Within Defined Limits;Able to read normal print without difficulty      Coordination: BUE  Fine Motor Skills-Upper: Left Intact; Right Intact    Gross Motor Skills-Upper: Left Intact; Right Intact    Balance:  Sitting: Intact  Standing: Intact    Strength: BUE  Strength:  Within functional limits    Tone & Sensation: BUE  Tone: Normal  Sensation: Intact    Range of Motion: BUE  AROM: Within functional limits    Functional Mobility and Transfers for ADLs:    Transfers:  Sit to Stand: Modified independent  Stand to Sit: Modified independent   Toilet Transfer : Modified independent       ADL Assessment:  Feeding: Independent    Oral Facial Hygiene/Grooming: Independent    Bathing: Independent    Upper Body Dressing: Independent    Lower Body Dressing: Independent    Toileting: Independent    ADL Intervention:  Grooming  Grooming Assistance: Independent  Position Performed: Standing  Washing Face: Independent    Upper Body Bathing  Bathing Assistance: Independent    Lower Body Bathing  Bathing Assistance: Independent    Upper Body Dressing Assistance  Dressing Assistance: Independent  Orthotics(Brace): Independent  Pullover Shirt: Independent    Lower Body Dressing Assistance  Dressing Assistance: Independent  Underpants: Independent  Pants With Elastic Waist: Independent  Socks: Independent  Leg Crossed Method Used: Yes  Position Performed: Seated in chair;Standing      Cognitive Retraining  Safety/Judgement: Fall prevention    Pain:  Pain level pre-treatment: 0/10   Pain level post-treatment: 0/10   Pain Intervention(s): Medication (see MAR); Response to intervention: Nurse notified, See doc flow    Activity Tolerance:   Good    Please refer to the flowsheet for vital signs taken during this treatment. After treatment:   [x]  Patient left in no apparent distress sitting up in chair  []  Patient left in no apparent distress in bed  [x]  Call bell left within reach  [x]  Nursing notified  []  Caregiver present  []  Bed alarm activated    COMMUNICATION/EDUCATION:   [x]      Role of Occupational Therapy in the acute care setting  [x]      Home safety education was provided and the patient/caregiver indicated understanding. [x]      Patient/family have participated as able and agree with findings and recommendations. []      Patient is unable to participate in plan of care at this time. Thank you for this referral.  Radha Sanchez OTR/L  Time Calculation: 29 mins      Eval Complexity: History: LOW Complexity : Brief history review ; Examination: LOW Complexity : 1-3 performance deficits relating to physical, cognitive , or psychosocial skils that result in activity limitations and / or participation restrictions ;    Decision Making:LOW Complexity : No comorbidities that affect functional and no verbal or physical assistance needed to complete eval tasks

## 2020-10-13 NOTE — PROGRESS NOTES
Discharge order noted for today. Pt has been accepted to [facility] agency. Met with patient and he is agreeable to the transition plan today. Transport has been arranged through patients wife, Terri Li. Patient's discharge summary and home health  orders have been forwarded to [facility] health  agency via [facility]. Updated bedside RN, Ursula,  to the transition plan.   Discharge information has been documented on the AVS.       Ethel Adams RN - Outcomes Manager  [phone]

## 2020-10-13 NOTE — PROGRESS NOTES
Attempted to perform rounds. Patient has already been discharged. Will follow-up via telephone call tomorrow.     NANETTE CelestinN, RN, [address] [place] [address]  Orthopedic

## 2020-10-13 NOTE — PROGRESS NOTES
vss afeb  Neuro intact  Some dysphagia  andrew now scant  Plan  Dc andrew  Pt ot  Dc home  Fu 2 weeks

## 2020-10-13 NOTE — ROUTINE PROCESS
I have reviewed discharge instructions with the patient. The patient verbalized understanding. Patient armband removed and shredded. Dressing C/D/I.

## 2020-10-13 NOTE — DISCHARGE INSTRUCTIONS
Post-Operative Discharge Instructions after [facility]  [phone]      You will return to the office 2 weeks after surgery. (Appointment was made at the time you scheduled your surgery)    Call office or physician on-call for any of the following:  · Fever greater than 100.5  · Uncontrollable chills  · Drainage from incision  · Pain not controlled by pain medication  · New pain  · New weakness or progressive weakness  · Food sticking in throat or excessive coughing when swallowing    Stop all anti-inflammatories (arthritis medication) such as Ibuprofen, Motrin, Aleve, Voltaren, Relafen, Naproxen, Arthrotec, etc. for 12 weeks ONLY if you had a neck or back Fusion. You may take Tylenol or acetaminophen over the counter. You may take Tylenol for pain. Tylenol ***** mg    Next time you can take is ******    Take your pain medication as prescribed. Medication Name ******    Next dose can be taken at ******    Follow Neck Precautions: No \"Yes or No\" Motion                                            No  Ear to Shoulder Motion                                            No Lifting Arms over your head                                             Sleep sitting up for at least 3-4 nights to prevent swelling to neck. No Heat to your neck. May remove KAROLINE stockings when discharged from the hospital.    May shower one week after surgery. Leave dressing on while you shower; the dressing is waterproof. No need for a dressing on neck patients after the third day. Abstain from driving until your first post-operative visit. If you must drive, do not take pain medications that may alter your abilities. Neck collars are for comfort only. Neck patients feel better sleeping in a recliner or \"propped up\" position for a few days after surgery. This helps reduce the swelling. It is normal for neck patients to have some difficulty swallowing the first few days. Use a straw for all liquids. Cut up solid foods smaller than normal until you are swallowing without difficulty. Walking is the best therapy after back surgery. Avoid extremes of bending, twisting, or lifting. All post-operative surgical patients should function within the range of the pain. \"If it hurts - do not do it. \"    Take a laxative when you get home and every day you are taking a narcotic. Thank you for choosing [facility] for your neck surgery.

## 2020-10-13 NOTE — PROGRESS NOTES
Problem: Risk for Spread of Infection  Goal: Prevent transmission of infectious organism to others  Description: Prevent the transmission of infectious organisms to other patients, staff members, and visitors. Outcome: Resolved/Met     Problem: Patient Education:  Go to Education Activity  Goal: Patient/Family Education  Outcome: Resolved/Met     Problem: Falls - Risk of  Goal: *Absence of Falls  Description: Document Jeramy Fall Risk and appropriate interventions in the flowsheet. Outcome: Resolved/Met  Note: Fall Risk Interventions:            Medication Interventions: Assess postural VS orthostatic hypotension, Evaluate medications/consider consulting pharmacy, Teach patient to arise slowly, Patient to call before getting OOB    Elimination Interventions: Bed/chair exit alarm, Call light in reach, Elevated toilet seat, Patient to call for help with toileting needs, Stay With Me (per policy), Toilet paper/wipes in reach, Urinal in reach    History of Falls Interventions: Bed/chair exit alarm, Consult care management for discharge planning, Door open when patient unattended, Evaluate medications/consider consulting pharmacy         Problem: Patient Education: Go to Patient Education Activity  Goal: Patient/Family Education  Outcome: Resolved/Met     Problem: Diabetes Self-Management  Goal: *Disease process and treatment process  Description: Define diabetes and identify own type of diabetes; list 3 options for treating diabetes. Outcome: Resolved/Met  Goal: *Incorporating nutritional management into lifestyle  Description: Describe effect of type, amount and timing of food on blood glucose; list 3 methods for planning meals. Outcome: Resolved/Met  Goal: *Incorporating physical activity into lifestyle  Description: State effect of exercise on blood glucose levels.   Outcome: Resolved/Met  Goal: *Developing strategies to promote health/change behavior  Description: Define the ABC's of diabetes; identify appropriate screenings, schedule and personal plan for screenings. Outcome: Resolved/Met  Goal: *Using medications safely  Description: State effect of diabetes medications on diabetes; name diabetes medication taking, action and side effects. Outcome: Resolved/Met  Goal: *Monitoring blood glucose, interpreting and using results  Description: Identify recommended blood glucose targets  and personal targets. Outcome: Resolved/Met  Goal: *Prevention, detection, treatment of acute complications  Description: List symptoms of hyper- and hypoglycemia; describe how to treat low blood sugar and actions for lowering  high blood glucose level. Outcome: Resolved/Met  Goal: *Prevention, detection and treatment of chronic complications  Description: Define the natural course of diabetes and describe the relationship of blood glucose levels to long term complications of diabetes.   Outcome: Resolved/Met  Goal: *Developing strategies to address psychosocial issues  Description: Describe feelings about living with diabetes; identify support needed and support network  Outcome: Resolved/Met  Goal: *Insulin pump training  Outcome: Resolved/Met  Goal: *Sick day guidelines  Outcome: Resolved/Met  Goal: *Patient Specific Goal (EDIT GOAL, INSERT TEXT)  Outcome: Resolved/Met     Problem: Patient Education: Go to Patient Education Activity  Goal: Patient/Family Education  Outcome: Resolved/Met     Problem: Patient Education: Go to Patient Education Activity  Goal: Patient/Family Education  Outcome: Resolved/Met     Problem: Patient Education: Go to Patient Education Activity  Goal: Patient/Family Education  Outcome: Resolved/Met

## 2020-10-13 NOTE — DISCHARGE SUMMARY
Discharge  Summary     Patient: Kristan Guzman MRN: [id number]  SSN: [id number]    YOB: 1960  Age: 61 y.o. Sex: male       Admit Date: 10/12/2020    Discharge Date: 10/13/2020      Admission Diagnoses: Cervical spondylosis with myelopathy [M47.12]  Cervical myelopathy ([facility] [address] [G95.9]    Discharge Diagnoses:   Problem List as of 10/13/2020 Date Reviewed: 9/4/2020          Codes Class Noted - Resolved    Cervical myelopathy ([address] ICD-10-CM: G95.9  ICD-9-CM: 721.1  10/12/2020 - Present        Spondylosis of cervical region without myelopathy or radiculopathy ICD-10-CM: M47.812  ICD-9-CM: 721.0  8/3/2016 - Present        Spondylosis of lumbar region without myelopathy or radiculopathy ICD-10-CM: M47.816  ICD-9-CM: 721.3  8/3/2016 - Present        Lumbar facet arthropathy ICD-10-CM: M47.816  ICD-9-CM: 721.3  8/3/2016 - Present               Discharge Condition: Good    Procedure: Anterior cervical decompression and fusion, C4-5, C5-6, C6-7; structural allograft x3; anterior cervical plate fixation at C4, C5, C6, C7 with NuVasive anterior cervical locking plate and screws. Hospital Course: Normal hospital course for this procedure. Tolerated surgical intervention well. Incision dry and intact. Ambulatory. Swallowing well. Disposition: home      Face to Face Encounter    Patients Name: Kristan Guzman  YOB: 1960    Primary Diagnosis: Cervical spondylitic myelopathy     Date of Face to Face:   10/13/2020                                   Face to Face Encounter findings are related to primary reason for home care:   yes    1. I certify that the patient needs intermittent skilled nursing care, physical therapy and/or speech therapy. I will be following this patient in the Community and will be responsible for reviewing and signing the [address]     2. Initial Orders for Care: Must be completed only if Face to Face MD will not be signing the [address] of Care. Skilled Nursing and Physical Therapy    3. I certify that this patient is homebound for the following reason(s): Requires considerable and taxing effort to leave the home     4. I certify that this patient is under my care and that I, or a nurse practitioner or  [id number] working with me, had a Face-to-Face Encounter that meets the physician Face-to-Face Encounter requirements. Document the physical findings from the Face-to-Face Encounter that support the need for skilled services: Has wound that requires skilled nursing assessment and treatment     Alexandr Pain, NP  10/13/2020          Discharge Medications:      My Medications      TAKE these medications as instructed      Instructions Each Dose to Equal Morning Noon Evening Bedtime   AMLODIPINE PO    Your last dose was: Your next dose is: Take 5 mg by mouth. 5 mg                 aspirin 81 mg chewable tablet    Your last dose was: Your next dose is: Take 81 mg by mouth daily. 81 mg                 * baclofen 10 mg tablet  Commonly known as:  LIORESAL    Your last dose was: Your next dose is: Take  by mouth three (3) times daily. * baclofen 10 mg tablet  Commonly known as:  LIORESAL    Your last dose was: Your next dose is:                          * blood glucose test strip  Generic drug:  glucose blood VI test strips    Your last dose was: Your next dose is:          Use to test blood sugars  daily as directed                  * CONTOUR NEXT TEST STRIPS    Your last dose was: Your next dose is:          Contour Next Test Strips   Check blood glucose bid Diagnosis: DM (250.00)                  Cialis 5 mg tablet  Generic drug:  tadalafiL    Your last dose was: Your next dose is: Take 5 mg by mouth. 5 mg                 diclofenac EC 75 mg EC tablet  Commonly known as:  VOLTAREN    Your last dose was: Your next dose is:           Take  by mouth.                  finasteride 5 mg tablet  Commonly known as:  PROSCAR    Your last dose was: Your next dose is: Take 5 mg by mouth daily. 5 mg                 FLEXERIL PO    Your last dose was: Your next dose is: Take 5 mg by mouth three (3) times daily as needed. 5 mg                 * gabapentin 300 mg capsule  Commonly known as:  NEURONTIN    Your last dose was: Your next dose is: Take 300 mg by mouth three (3) times daily. 300 mg                 * gabapentin 300 mg capsule  Commonly known as:  NEURONTIN    Your last dose was: Your next dose is:                          glimepiride 2 mg tablet  Commonly known as:  AMARYL    Your last dose was: Your next dose is: Take  by mouth every morning. * hydroCHLOROthiazide 25 mg tablet  Commonly known as:  HYDRODIURIL    Your last dose was: Your next dose is: Take 25 mg by mouth daily. 25 mg                 * hydroCHLOROthiazide 25 mg tablet  Commonly known as:  HYDRODIURIL    Your last dose was: Your next dose is:                          HYDROcodone-acetaminophen 5-325 mg per tablet  Commonly known as:  Cornel3 Cira Grover    Your last dose was: Your next dose is: Take  by mouth.                  levoFLOXacin 750 mg tablet  Commonly known as:  Levaquin    Your last dose was: Your next dose is: Take 1 Tab by mouth daily. 750 mg                 * metFORMIN 500 mg tablet  Commonly known as:  GLUCOPHAGE    Your last dose was: Your next dose is: Take  by mouth two (2) times daily (with meals). * metFORMIN 500 mg tablet  Commonly known as:  GLUCOPHAGE    Your last dose was: Your next dose is:                          OneTouch Verio IQ Meter monitoring kit  Generic drug:  Blood-Glucose Meter    Your last dose was:       Your next dose is:          Use to test blood sugars twice daily as directed oxyCODONE IR 5 mg immediate release tablet  Commonly known as:  Roxicodone    Your last dose was: Your next dose is: Take 1 Tab by mouth every six (6) hours as needed for Pain for up to 7 days. Max Daily Amount: 20 mg.   5 mg                 predniSONE 10 mg tablet  Commonly known as:  [address] last dose was: Your next dose is: Take  by mouth daily (with breakfast). * simvastatin 10 mg tablet  Commonly known as:  ZOCOR    Your last dose was: Your next dose is: Take  by mouth nightly. * simvastatin 10 mg tablet  Commonly known as:  ZOCOR    Your last dose was: Your next dose is:                          tamsulosin 0.4 mg capsule  Commonly known as:  FLOMAX    Your last dose was: Your next dose is: Take 1 Cap by mouth daily (after dinner). 0.4 mg                 telmisartan 80 mg tablet  Commonly known as:  MICARDIS    Your last dose was: Your next dose is: Take 80 mg by mouth daily. 80 mg                 * traMADoL 50 mg tablet  Commonly known as:  ULTRAM    Your last dose was: Your next dose is: Take 50 mg by mouth every six (6) hours as needed for Pain. 50 mg                 * traMADoL 50 mg tablet  Commonly known as:  ULTRAM    Your last dose was: Your next dose is:          TAKE ONE TABLET BY MOUTH THREE TIMES A DAY AS NEEDED FOR PAIN                      * This list has 14 medication(s) that are the same as other medications prescribed for you. Read the directions carefully, and ask your doctor or other care provider to review them with you.                Where to Get Your Medications      These medications were sent to [address], [facility], [address] [place] [address], [address] [place] [id number]    Phone:  [phone]   · oxyCODONE IR 5 mg immediate release tablet               Follow-up Appointments   Procedures    FOLLOW UP VISIT Appointment in: Two Weeks     Standing Status:   Standing     Number of Occurrences:   1     Order Specific Question:   Appointment in     Answer:    Two Weeks       Signed By: Alexandr Braga NP     October 13, 2020

## 2020-10-13 NOTE — PROGRESS NOTES
Problem: Risk for Spread of Infection  Goal: Prevent transmission of infectious organism to others  Description: Prevent the transmission of infectious organisms to other patients, staff members, and visitors. Outcome: Progressing Towards Goal     Problem: Patient Education:  Go to Education Activity  Goal: Patient/Family Education  Outcome: Progressing Towards Goal     Problem: Falls - Risk of  Goal: *Absence of Falls  Description: Document Doug Tobin Fall Risk and appropriate interventions in the flowsheet. Outcome: Progressing Towards Goal  Note: Fall Risk Interventions:            Medication Interventions: Assess postural VS orthostatic hypotension, Evaluate medications/consider consulting pharmacy, Teach patient to arise slowly, Patient to call before getting OOB    Elimination Interventions: Bed/chair exit alarm, Call light in reach, Elevated toilet seat, Patient to call for help with toileting needs, Stay With Me (per policy), Toilet paper/wipes in reach, Urinal in reach    History of Falls Interventions: Bed/chair exit alarm, Consult care management for discharge planning, Door open when patient unattended, Evaluate medications/consider consulting pharmacy         Problem: Patient Education: Go to Patient Education Activity  Goal: Patient/Family Education  Outcome: Progressing Towards Goal     Problem: Diabetes Self-Management  Goal: *Disease process and treatment process  Description: Define diabetes and identify own type of diabetes; list 3 options for treating diabetes. Outcome: Progressing Towards Goal  Goal: *Incorporating nutritional management into lifestyle  Description: Describe effect of type, amount and timing of food on blood glucose; list 3 methods for planning meals. Outcome: Progressing Towards Goal  Goal: *Incorporating physical activity into lifestyle  Description: State effect of exercise on blood glucose levels.   Outcome: Progressing Towards Goal  Goal: *Developing strategies to promote health/change behavior  Description: Define the ABC's of diabetes; identify appropriate screenings, schedule and personal plan for screenings. Outcome: Progressing Towards Goal  Goal: *Using medications safely  Description: State effect of diabetes medications on diabetes; name diabetes medication taking, action and side effects. Outcome: Progressing Towards Goal  Goal: *Monitoring blood glucose, interpreting and using results  Description: Identify recommended blood glucose targets  and personal targets. Outcome: Progressing Towards Goal  Goal: *Prevention, detection, treatment of acute complications  Description: List symptoms of hyper- and hypoglycemia; describe how to treat low blood sugar and actions for lowering  high blood glucose level. Outcome: Progressing Towards Goal  Goal: *Prevention, detection and treatment of chronic complications  Description: Define the natural course of diabetes and describe the relationship of blood glucose levels to long term complications of diabetes.   Outcome: Progressing Towards Goal  Goal: *Developing strategies to address psychosocial issues  Description: Describe feelings about living with diabetes; identify support needed and support network  Outcome: Progressing Towards Goal  Goal: *Insulin pump training  Outcome: Progressing Towards Goal  Goal: *Sick day guidelines  Outcome: Progressing Towards Goal  Goal: *Patient Specific Goal (EDIT GOAL, INSERT TEXT)  Outcome: Progressing Towards Goal     Problem: Patient Education: Go to Patient Education Activity  Goal: Patient/Family Education  Outcome: Progressing Towards Goal     Problem: Patient Education: Go to Patient Education Activity  Goal: Patient/Family Education  Outcome: Progressing Towards Goal

## 2020-10-13 NOTE — HOME CARE
Discharge noted for today. Received home health referral for [facility] for SN and PT. Spoke with patient, explained services and answered all questions. Demographics verified. Referral processed and arranged through central office. Patient has the following DME: none.  Iliana Joseph [facility] Liaison

## 2020-10-13 NOTE — OP NOTES
[address] [place] [address]   OPERATIVE REPORT    Name:  Wayne Bonilla  MR#:   [id number]  :  1960  ACCOUNT #:  [de-identified]  DATE OF SERVICE:  10/12/2020    PREOPERATIVE DIAGNOSIS:  Cervical spondylitic myelopathy. POSTOPERATIVE DIAGNOSIS:  Cervical spondylitic myelopathy. PROCEDURES PERFORMED:  Anterior cervical decompression and fusion, C4-5, C5-6, C6-7; structural allograft x3; anterior cervical plate fixation at C4, C5, C6, C7 with NuVasive anterior cervical locking plate and screws. SURGEON:  Kinjal Jones MD    ASSISTANT:  None. ANESTHESIA:  General endotracheal.    COMPLICATIONS:  None. SPECIMENS REMOVED:  None. IMPLANTS:  nuvasive  . ESTIMATED BLOOD LOSS:  minimal.    FINDINGS:  The patient had severe spondylitic stenosis with uncinate spurring and thickened ligament. The ligament was partially ossified and quite inflamed and tenacious. PROCEDURE:  Following induction of general endotracheal anesthesia, the patient was placed with the head in neutral position on Sanches headrest.  The patient was prepped and draped in the usual fashion. Right-sided approach was utilized. Sternocleidomastoid and great vessels were mobilized laterally. Esophagus and trachea mobilized medially. With blunt finger dissection, the prevertebral fascia was entered, and C-arm image verified our surgical level. Gibson pins were placed in the bodies of C4 and C5. Cloward self-retaining retractor blades were placed under the cover of longus colli musculature bilaterally. Anterior marginal osteophytes were resected. A radical discectomy was done back to the posterior margin. Posterior marginal osteophytes were thinned with a high-speed bur, resected with a 4.0 Safia curette and sella punch. Thorough decompression of the dura was done under direct visualization. Endplates were prepared. A structural allograft was sized and tamped firmly into place with excellent bony apposition.   Procedure was repeated then at C5-6 and C6-7. C6-7 was the most difficult, both because of the level of the disk, the severity of the ligamentous hypertrophy, and its adherence to the dura. Thorough decompression was accomplished at each level. Grafts were placed, and then the anterior cervical locking plate was sized and affixed to the spine with two screws in C4, two in C5, two in C6, and two in C7 with the locking device engaged. The wound was copiously irrigated. There was no apparent bleeding. Vancomycin powder was instilled for infection prophylaxis. A deep drain was utilized.         MD HUSEYIN [id number]  D:  10/12/2020 10:27  T:  10/12/2020 21:26  JOB #:  [id number]

## 2020-10-13 NOTE — PROGRESS NOTES
Reason for Admission:  Cervical spondylosis with myelopathy [M47.12]  Cervical myelopathy ([facility] [address] [G95.9]                 RUR Score:                Plan for utilizing home health:    Yes, freedom of choice signed for [facility]                      Likelihood of Readmission:   LOW                         Transition of Care Plan:              Initial assessment completed with patient. Cognitive status of patient: oriented to time, place, person and situation. Face sheet information confirmed:  yes. The patient designates wife, Tressa Mcdowell) to participate in his discharge plan and to receive any needed information. This patient lives in a apartment with spouse. Patient is able to navigate steps as needed. Prior to hospitalization, patient was considered to be independent with ADLs/IADLS : yes . Patient has a current ACP document on file: yes  The spouse will be available to transport patient home upon discharge. The patient has no DME available in the home. Patient is not currently active with home health. Patient has not stayed in a skilled nursing facility or rehab. This patient is on dialysis :no    List of available Home Health agencies were provided and reviewed with the patient prior to discharge. Freedom of choice signed: yes, for [facility]. Currently, the discharge plan is Home with [address] [place] [address] Adrián Vergara. The patient states that he can obtain his medications from the pharmacy, and take his medications as directed. Patient's current insurance is [facility] [employer]       Care Management Interventions  PCP Verified by CM:  Yes  Mode of Transport at Discharge: Self  Transition of Care Consult (CM Consult): Discharge Planning, [address]: Yes  Current Support Network: Lives with Spouse  Confirm Follow Up Transport: Family  The Patient and/or Patient Representative was Provided with a Choice of Provider and Agrees with the Discharge Plan?: Yes  Freedom of Choice List was Provided with Basic Dialogue that Supports the Patient's Individualized Plan of Care/Goals, Treatment Preferences and Shares the Quality Data Associated with the Providers?: Yes  Discharge Location  Discharge Placement: Home with home health        Clover Cuello RN - Outcomes Manager  [phone]

## 2020-10-14 ENCOUNTER — HOME CARE VISIT (OUTPATIENT)
Dept: SCHEDULING | Facility: HOME HEALTH | Age: 60
End: 2020-10-14
Payer: COMMERCIAL

## 2020-10-14 ENCOUNTER — PATIENT OUTREACH (OUTPATIENT)
Dept: CASE MANAGEMENT | Age: 60
End: 2020-10-14

## 2020-10-14 VITALS
RESPIRATION RATE: 18 BRPM | OXYGEN SATURATION: 94 % | HEART RATE: 87 BPM | DIASTOLIC BLOOD PRESSURE: 62 MMHG | SYSTOLIC BLOOD PRESSURE: 110 MMHG | TEMPERATURE: 96.7 F

## 2020-10-14 VITALS
DIASTOLIC BLOOD PRESSURE: 62 MMHG | OXYGEN SATURATION: 94 % | RESPIRATION RATE: 17 BRPM | SYSTOLIC BLOOD PRESSURE: 110 MMHG | TEMPERATURE: 96.7 F | HEART RATE: 87 BPM

## 2020-10-14 PROCEDURE — G0151 HHCP-SERV OF PT,EA 15 MIN: HCPCS

## 2020-10-14 PROCEDURE — A6258 TRANSPARENT FILM >16<=48 IN: HCPCS

## 2020-10-14 PROCEDURE — G0299 HHS/HOSPICE OF RN EA 15 MIN: HCPCS

## 2020-10-14 PROCEDURE — 400013 HH SOC

## 2020-10-14 NOTE — PROGRESS NOTES
Care Transitions Nurse ( CTN) attempted to contact patient via telephone call regarding hospital discharge and Covid-19 risk information/education. There was no response. A voicemail message was left requesting a non-emergency return telephone call. CTN  contact information provided.

## 2020-10-15 ENCOUNTER — DOCUMENTATION ONLY (OUTPATIENT)
Dept: ORTHOPEDIC SURGERY | Age: 60
End: 2020-10-15

## 2020-10-15 ENCOUNTER — PATIENT OUTREACH (OUTPATIENT)
Dept: CASE MANAGEMENT | Age: 60
End: 2020-10-15

## 2020-10-15 ENCOUNTER — HOME CARE VISIT (OUTPATIENT)
Dept: HOME HEALTH SERVICES | Facility: HOME HEALTH | Age: 60
End: 2020-10-15
Payer: COMMERCIAL

## 2020-10-15 NOTE — PROGRESS NOTES
Form from Zohreh Barr has been sent for more info. Request has been sent to [employer] and given to NP to complete. Form is scanned in CC.

## 2020-10-15 NOTE — PROGRESS NOTES
Patient contacted regarding recent discharge and COVID-19 risk. COVID-19 related testing  was not done at this time/or completed prior to admission. Care Transition Nurse/ Ambulatory Care Manager/ LPN Care Coordinator contacted the patient by telephone to perform post discharge assessment. Verified name and  with patient as identifiers. Patient has following risk factors of: diabetes. CTN/ACM/LPN reviewed discharge instructions, medical action plan and red flags related to discharge diagnosis. Patient reports that [facility] staff have reviewed medications with him. Advance Care Planning:   Does patient have an Advance Directive: not noted to be on file at this time    Education provided regarding infection prevention, and signs and symptoms of COVID-19 and when to seek medical attention with patient who verbalized understanding. Discussed exposure protocols and quarantine from [address] you at higher risk for severe illness  and given an opportunity for questions and concerns. The patient agrees to contact the COVID-19 hotline [phone] or PCP office for questions related to their healthcare. CTN/ACM/LPN provided contact information for future reference. From CDC: Are you at higher risk for severe illness?  Wash your hands often.  Avoid close contact (6 feet, which is about two arm lengths) with people who are sick.  Put distance between yourself and other people if COVID-19 is spreading in your community.  Clean and disinfect frequently touched surfaces.  Avoid all cruise travel and non-essential air travel.  Call your healthcare professional if you have concerns about COVID-19 and your underlying condition or if you are sick.     For more information on steps you can take to protect yourself, see CDC's How to Protect Yourself      Patient/family/caregiver given information for Haylee Shetty and agrees to enroll yes  Patient's preferred e-mail: N/a     [phone]:  Patient's preferred phone number:     Based on Loop alert triggers, patient will be contacted by nurse care manager for worsening symptoms. Pt will be further monitored by COVID Loop Team based on severity of symptoms and risk factors.

## 2020-10-18 ENCOUNTER — HOME CARE VISIT (OUTPATIENT)
Dept: SCHEDULING | Facility: HOME HEALTH | Age: 60
End: 2020-10-18
Payer: COMMERCIAL

## 2020-10-18 PROCEDURE — G0299 HHS/HOSPICE OF RN EA 15 MIN: HCPCS

## 2020-10-19 VITALS
SYSTOLIC BLOOD PRESSURE: 122 MMHG | TEMPERATURE: 98 F | OXYGEN SATURATION: 98 % | RESPIRATION RATE: 16 BRPM | DIASTOLIC BLOOD PRESSURE: 70 MMHG | HEART RATE: 88 BPM

## 2020-10-21 ENCOUNTER — TELEPHONE (OUTPATIENT)
Dept: ORTHOPEDIC SURGERY | Age: 60
End: 2020-10-21

## 2020-10-21 NOTE — TELEPHONE ENCOUNTER
Form has been faxed to [facility] I will call the patient to let her know it has been completed.

## 2020-10-24 ENCOUNTER — HOME CARE VISIT (OUTPATIENT)
Dept: SCHEDULING | Facility: HOME HEALTH | Age: 60
End: 2020-10-24
Payer: COMMERCIAL

## 2020-10-24 PROCEDURE — G0299 HHS/HOSPICE OF RN EA 15 MIN: HCPCS

## 2020-10-26 ENCOUNTER — OFFICE VISIT (OUTPATIENT)
Dept: ORTHOPEDIC SURGERY | Age: 60
End: 2020-10-26
Payer: COMMERCIAL

## 2020-10-26 ENCOUNTER — TELEPHONE (OUTPATIENT)
Dept: SURGICAL ICU | Age: 60
End: 2020-10-26

## 2020-10-26 ENCOUNTER — TELEPHONE (OUTPATIENT)
Dept: ORTHOPEDIC SURGERY | Age: 60
End: 2020-10-26

## 2020-10-26 VITALS
OXYGEN SATURATION: 96 % | TEMPERATURE: 98.4 F | RESPIRATION RATE: 16 BRPM | SYSTOLIC BLOOD PRESSURE: 132 MMHG | DIASTOLIC BLOOD PRESSURE: 70 MMHG | HEART RATE: 84 BPM

## 2020-10-26 VITALS
OXYGEN SATURATION: 96 % | HEART RATE: 93 BPM | RESPIRATION RATE: 16 BRPM | SYSTOLIC BLOOD PRESSURE: 129 MMHG | BODY MASS INDEX: 31.08 KG/M2 | DIASTOLIC BLOOD PRESSURE: 87 MMHG | HEIGHT: 75 IN | WEIGHT: 250 LBS | TEMPERATURE: 97.3 F

## 2020-10-26 DIAGNOSIS — G95.9 CERVICAL MYELOPATHY (HCC): Primary | ICD-10-CM

## 2020-10-26 DIAGNOSIS — Z98.1 S/P CERVICAL SPINAL FUSION: ICD-10-CM

## 2020-10-26 PROCEDURE — 99024 POSTOP FOLLOW-UP VISIT: CPT | Performed by: NURSE PRACTITIONER

## 2020-10-26 NOTE — LETTER
NOTIFICATION RETURN TO WORK / SCHOOL 
 
10/28/2020 11:43 AM 
 
Mr. Corinne Torres [address] [facility] [id number] To Whom It May Concern: 
 
Corinne Torres is currently under the care of [facility]. He will return to work/school on: 11/7/20 with the following restrictions; no lifting greater than 10-15lbs. Avoid overhead activity and pushing/pulling. He will be re-evaluated on 12/3/2020 for continued or lifted restrictions. If there are questions or concerns please have the patient contact our office. Sincerely, Sarah Manzo NP 
 
                                
 
 Refilled meds.  Patient was due for follow up.  Have patient schedule.

## 2020-10-26 NOTE — PATIENT INSTRUCTIONS
Cervical Spinal Fusion: What to Expect at [facility] Your Recovery Cervical spinal fusion is surgery that joins two or more of the vertebrae in your neck. It made your neck more stable. After surgery, you can expect your neck to feel stiff and sore. This should improve in the weeks after surgery. You may have trouble sitting or standing in one position for very long and may need pain medicine in the weeks after your surgery. You may need to wear a neck brace for a while. It may take 4 to 6 weeks to get back to your usual activities. How long it takes depends on what kind of surgery you had. Your doctor may advise you to work with a physical therapist to strengthen the muscles around your neck and back. This care sheet gives you a general idea about how long it will take for you to recover. But each person recovers at a different pace. Follow the steps below to get better as quickly as possible. How can you care for yourself at home? Activity 
  · Rest when you feel tired. Getting enough sleep will help you recover.  
  · Try to walk each day. Start by walking a little more than you did the day before. Bit by bit, increase the amount you walk. Walking boosts blood flow and helps prevent pneumonia and constipation. Walking may also decrease your muscle soreness after surgery.  
  · Follow your doctor's directions about not lifting anything that would strain your neck and back. This may include a child, heavy grocery bags and milk containers, a heavy briefcase or backpack, cat litter or dog food bags, or a vacuum .  
  · Avoid strenuous activities, such as bicycle riding, jogging, weightlifting, or aerobic exercise, until your doctor says it is okay.  
  · Do not drive for 2 to 4 weeks after your surgery or until your doctor says it is okay.  
  · Avoid taking long car trips for 2 to 4 weeks after surgery. Your neck may become tired and painful from sitting too long in one position.   · You will probably need to take 4 to 6 weeks off from work. It depends on the type of work you do and how you feel.  
  · You may have sex as soon as you feel able, but avoid positions that put stress on your neck or cause pain. Diet 
  · You can eat your normal diet. If your stomach is upset, try bland, low-fat foods like plain rice, broiled chicken, toast, and yogurt.  
  · Drink plenty of fluids. If you have kidney, heart, or liver disease and have to limit fluids, talk with your doctor before you increase the amount of fluids you drink.  
  · You may notice that your bowel movements are not regular right after your surgery. This is common. Try to avoid constipation and straining with bowel movements. You may want to take a fiber supplement every day. If you have not had a bowel movement after a couple of days, ask your doctor about taking a mild laxative. Medicines 
  · Your doctor will tell you if and when you can restart your medicines. He or she will also give you instructions about taking any new medicines.  
  · If you take aspirin or some other blood thinner, ask your doctor if and when to start taking it again. Make sure that you understand exactly what your doctor wants you to do.  
  · Be safe with medicines. Take pain medicines exactly as directed. ? If the doctor gave you a prescription medicine for pain, take it as prescribed. ? If you are not taking a prescription pain medicine, ask your doctor if you can take an over-the-counter medicine.  
  · If your doctor prescribed antibiotics, take them as directed. Do not stop taking them just because you feel better. You need to take the full course of antibiotics.  
  · If you think your pain pill is making you sick to your stomach: 
? Take your pills after meals (unless your doctor has told you not to). ? Ask your doctor for a different pain pill.   
Incision care 
  · You will be given specific instructions about how to care for the cut (incision) the doctor made. The instructions will depend on the type of materials used to close the cut. Exercise 
  · Do exercises as instructed by your doctor or physical therapist to improve your strength and flexibility. Other instructions 
  · Follow your doctor's instructions about wearing a brace or collar to support your neck.  
  · To reduce stiffness and help sore muscles, use a warm water bottle, a heating pad set on low, or a warm cloth on your neck. Do not put heat right over the incision. Do not go to sleep with a heating pad on your skin. Follow-up care is a key part of your treatment and safety. Be sure to make and go to all appointments, and call your doctor if you are having problems. It's also a good idea to know your test results and keep a list of the medicines you take. When should you call for help? Call 911 anytime you think you may need emergency care. For example, call if: 
  · You passed out (lost consciousness).  
  · You have chest pain, are short of breath, or cough up blood.  
  · You are unable to move an arm or a leg at all. Call your doctor now or seek immediate medical care if: 
  · You have pain that does not get better after you take pain medicine.  
  · You have loose stitches, or your incision comes open.  
  · Bright red blood has soaked through the bandage over your incision.  
  · You have signs of a blood clot in your leg (called a deep vein thrombosis), such as: 
? Pain in your calf, back of the knee, thigh, or groin. ? Redness or swelling in your leg.  
  · You have signs of infection, such as: 
? Increased pain, swelling, warmth, or redness. ? Red streaks leading from the incision. ? Pus draining from the incision. ? A fever.  
  · You have new or worse symptoms in your arms, legs, chest, belly, or buttocks. Symptoms may include: 
? Numbness or tingling. ? Weakness. ? Pain.  
  · You lose bladder or bowel control. Watch closely for any changes in your health, and be sure to contact your doctor if: 
  · You do not get better as expected. Where can you learn more? Go to http://www.gray.com/ Enter V392 in the search box to learn more about \"Cervical Spinal Fusion: What to Expect at Home. \" Current as of: March 2, 2020               Content Version: 12.6 © 8458-2248 [employer]. Care instructions adapted under license by [employer] (which disclaims liability or warranty for this information). If you have questions about a medical condition or this instruction, always ask your healthcare professional. [place] [address] any warranty or liability for your use of this information.

## 2020-10-26 NOTE — PROGRESS NOTES
[facility].  [address], [address]  [place], [address] [place] [address] Street  Phone: [phone]  Fax: [phone]  PROGRESS NOTE-Post-op  Patient: Cody Montenegro                MRN: [id number]       SSN: [id number]  YOB: 1960        AGE: 61 y.o. SEX: male  Body mass index is 31.25 kg/m². PCP: Salo De Leon, DO  10/26/20    Chief Complaint   Patient presents with    Neck Pain     posterior, 2 wk post op       HISTORY OF PRESENT ILLNESS:  Cody Montenegro is a 61 y.o. male with 10 years of progressive neck pain, but much worse over the past year or 2 with a progression of neck pain radiating to his left greater than right. His exam is significant for loss of intrinsic strength on the left poor tandem gait. He is a s/p ACDF C4-7 on 10/12/20 for his known myelopathy. He has been doing well since his surgery with improved arm pain and balance. Patient denies any fevers, wound drainage, bladder/bowel dysfunction, new onset weakness, saddle paresthesia, new onset radiculopathy or nerve pain, or other neurological deficits. Reports that he initially had some swallowing issues but it is better now. Pt desires to continue with evaluation of neck pain and postoperative care. Current Medications: Gabapentin 300mg TID thru PCP. 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 ASSESSMENT   Diagnoses and all orders for this visit:    1. Cervical myelopathy ([facility] [address]    2. S/P cervical spinal fusion         IMPRESSION AND PLAN:  This is a pt who had a ACDF surgery 2 weeks ago. He is doing well with improved pain and balance. His incision is healing nicely. Eric Jackson 1) Pt was given information on ACDF post-operative and wound care. 2) Reviewed activity and to avoid NSAIDs x 3 months. 3) Reviewed Dos and DONTs and   4) Mr. Vicente Postal has a reminder for a \"due or due soon\" health maintenance.  I have asked that he contact his primary care provider, Saritha Stevens DO, for follow-up on this health maintenance. 5) We have informed the patient to notify us for immediate appointment if he has any worsening neurogical symptoms or if an emergency situation presents, then call 911  6)  demonstrated consistency with prescribing. 7) Pt will follow-up in 4 wks. SUBJECTIVE    Pain Scale: 5/10    Pain Assessment  10/26/2020   Location of Pain Neck   Location Modifiers Posterior   Severity of Pain 5   Quality of Pain Sharp   Quality of Pain Comment -   Duration of Pain Persistent   Frequency of Pain Constant   Aggravating Factors Other (Comment)   Aggravating Factors Comment any head or neck movement   Limiting Behavior Some   Relieving Factors Rest   Relieving Factors Comment -   Result of Injury No           REVIEW OF SYSTEMS  Constitutional: Negative for fever, chills, or weight change. Respiratory: Negative for cough or shortness of breath. Cardiovascular: Negative for chest pain or palpitations. Gastrointestinal: Negative for incontinence, acid reflux, change in bowel habits, or constipation. Genitourinary: Negative for incontinence, dysuria and flank pain. Musculoskeletal: Positive for neck pain. See HPI. Skin: Negative for rash. Neurological:LUE radiculopathy. See HPI. Endo/Heme/Allergies: Negative. Psychiatric/Behavioral: Negative. PHYSICAL EXAMINATION  Visit Vitals  /87 (BP 1 Location: Left arm, BP Patient Position: Sitting)   Pulse 93   Temp 97.3 °F (36.3 °C) (Skin)   Resp 16   Ht 6' 3\" (1.905 m)   Wt 250 lb (113.4 kg)   SpO2 96%   BMI 31.25 kg/m²         Accompanied by self. Constitutional:  Well developed, well nourished, in no acute distress. Psychiatric: Affect and mood are appropriate. Integumentary: No rashes or abrasions noted on exposed areas.  Wound: anterior neck Incision healing well, no drainage, no erythema, no hernia, no seroma, no swelling, no dehiscence, incision well approximated. Cardiovascular/Peripheral Vascular: +2 radial & pedal pulses. No peripheral edema is noted. Lymphatic:  No evidence of lymphedema. No cervical lymphadenopathy. SPINE/MUSCULOSKELETAL EXAM    Cervical spine:    Removed and reapplied Soft Collar  Neck is midline. Normal muscle tone. No focal atrophy is noted. Shoulder ROM intact. Neck ROM NT. Tenderness to palpation Post neck and shoulders. Negative Connor's sign. Sensation grossly intact to light touch. MOTOR:     Biceps Triceps Deltoids Wrist Ext Wrist Flex Hand Intrin   Right 5/5 5/5 5/5 5/5 5/5 5/5   Left 5/5 5/5 5/5 5/5 5/5 5/5       normal gait and station    Ambulation without assistive device. FWB. PAST MEDICAL HISTORY   Past Medical History:   Diagnosis Date    Chronic low back pain     Degeneration of lumbar intervertebral disc     Diabetes ([facility] [address]     Diabetes mellitus ([facility] [address]     Essential hypertension     Hyperlipidemia     Hypertension     MRSA colonization 01/23/2020    nasal swab-pt was treated with Mupirocin    Shoulder pain        Past Surgical History:   Procedure Laterality Date    HX COLONOSCOPY      HX HERNIA REPAIR      HX OTHER SURGICAL Right 2005    knee surgery ACL    HX OTHER SURGICAL Right 02/2020    right knee replacement    PLASTIC SURGERY, NECK      fusion   . MEDICATIONS      Current Outpatient Medications   Medication Sig Dispense Refill    multivit-min/ferrous fumarate (MULTI VITAMIN PO) Take 1 Gum by mouth daily.  glucose blood VI test strips (BLOOD GLUCOSE TEST) strip Use to test blood sugars  daily as directed      Blood-Glucose Meter (ONETOUCH VERIO IQ METER) monitoring kit Use to test blood sugars twice daily as directed      blood sugar diagnostic (CONTOUR NEXT TEST STRIPS) Contour Next Test Strips   Check blood glucose bid Diagnosis: DM (250.00)      telmisartan (MICARDIS) 80 mg tablet Take 80 mg by mouth daily.       finasteride (PROSCAR) 5 mg tablet Take 5 mg by mouth daily.  cyclobenzaprine HCl (FLEXERIL PO) Take 5 mg by mouth three (3) times daily as needed.  glimepiride (AMARYL) 2 mg tablet Take  by mouth every morning.  diclofenac EC (VOLTAREN) 75 mg EC tablet Take 75 mg by mouth two (2) times a day.  amlodipine besylate (AMLODIPINE PO) Take 5 mg by mouth.  tamsulosin (FLOMAX) 0.4 mg capsule Take 1 Cap by mouth daily (after dinner). 90 Cap 3    levoFLOXacin (LEVAQUIN) 750 mg tablet Take 1 Tab by mouth daily. 1 Tab 0    tadalafil (CIALIS) 5 mg tablet Take 5 mg by mouth.  gabapentin (NEURONTIN) 300 mg capsule Take 300 mg by mouth three (3) times daily.  hydroCHLOROthiazide (HYDRODIURIL) 25 mg tablet Take 25 mg by mouth daily.  HYDROcodone-acetaminophen (NORCO) 5-325 mg per tablet Take  by mouth.  metFORMIN (GLUCOPHAGE) 500 mg tablet Take 500 mg by mouth two (2) times daily (with meals). 1 tab in am  2 in the evenint      predniSONE (DELTASONE) 10 mg tablet Take  by mouth daily (with breakfast).  simvastatin (ZOCOR) 10 mg tablet Take  by mouth nightly.  traMADol (ULTRAM) 50 mg tablet TAKE ONE TABLET BY MOUTH THREE TIMES A DAY AS NEEDED FOR PAIN 90 Tab 0    aspirin 81 mg chewable tablet Take 81 mg by mouth daily.  baclofen (LIORESAL) 10 mg tablet Take  by mouth three (3) times daily.  traMADol (ULTRAM) 50 mg tablet Take 50 mg by mouth every six (6) hours as needed for Pain.       baclofen (LIORESAL) 10 mg tablet   0    gabapentin (NEURONTIN) 300 mg capsule   1    hydrochlorothiazide (HYDRODIURIL) 25 mg tablet   2    metFORMIN (GLUCOPHAGE) 500 mg tablet   1    simvastatin (ZOCOR) 10 mg tablet   1        ALLERGIES    Allergies   Allergen Reactions    Lisinopril Cough          SOCIAL HISTORY    Social History     Socioeconomic History    Marital status:      Spouse name: Not on file    Number of children: Not on file    Years of education: Not on file    Highest education level: Not on file   Occupational History    Not on file   Social Needs    Financial resource strain: Not on file    Food insecurity     Worry: Not on file     Inability: Not on file    Transportation needs     Medical: Not on file     Non-medical: Not on file   Tobacco Use    Smoking status: Never Smoker    Smokeless tobacco: Never Used   Substance and Sexual Activity    Alcohol use: No     Alcohol/week: 0.0 standard drinks    Drug use: No    Sexual activity: Not Currently   Lifestyle    Physical activity     Days per week: Not on file     Minutes per session: Not on file    Stress: Not on file   Relationships    Social connections     Talks on phone: Not on file     Gets together: Not on file     Attends Quaker service: Not on file     Active member of club or organization: Not on file     Attends meetings of clubs or organizations: Not on file     Relationship status: Not on file    Intimate partner violence     Fear of current or ex partner: Not on file     Emotionally abused: Not on file     Physically abused: Not on file     Forced sexual activity: Not on file   Other Topics Concern    Not on file   Social History Narrative    ** Merged History Encounter **          Socioeconomic History    Marital status:      Spouse name: Not on file    Number of children: Not on file    Years of education: Not on file    Highest education level: Not on file   Occupational History    Not on file   Social Needs    Financial resource strain: Not on file    Food insecurity     Worry: Not on file     Inability: Not on file    Transportation needs     Medical: Not on file     Non-medical: Not on file   Tobacco Use    Smoking status: Never Smoker    Smokeless tobacco: Never Used   Substance and Sexual Activity    Alcohol use: No     Alcohol/week: 0.0 standard drinks    Drug use: No    Sexual activity: Not Currently   Lifestyle    Physical activity     Days per week: Not on file Minutes per session: Not on file    Stress: Not on file   Relationships    Social connections     Talks on phone: Not on file     Gets together: Not on file     Attends Restoration service: Not on file     Active member of club or organization: Not on file     Attends meetings of clubs or organizations: Not on file     Relationship status: Not on file    Intimate partner violence     Fear of current or ex partner: Not on file     Emotionally abused: Not on file     Physically abused: Not on file     Forced sexual activity: Not on file   Other Topics Concern    Not on file   Social History Narrative    ** Merged History Encounter **           Problem Relation Age of Onset    Hypertension Mother     Diabetes Father     Heart Failure Father     Hypertension Father          Jeanette Alamo NP

## 2020-10-26 NOTE — TELEPHONE ENCOUNTER
Orthopedic Coordinator Post Op Call    Surgical follow-up call completed. Patient reports the following:  Nausea: No  Fever: No  Shortness of Breath: No  Wound concerns: No  Pain manageable with prescribed medications: Yes  Bowel movement since surgery: Yes    Reviewed medication side effects. Opportunity for questions and clarification provided. No concerns at this time.     Reyes Boards, BSN, RN, [address] [place] [address]  Orthopedic

## 2020-10-26 NOTE — TELEPHONE ENCOUNTER
Patient needs a letter stating we changed his return to work date to 11/07 instead of 11/01. He states they did receive the original letter for 11/01.   Patient [phone]

## 2020-10-26 NOTE — LETTER
NOTIFICATION RETURN TO WORK / SCHOOL 
 
10/26/2020 1:14 PM 
 
Mr. Gerber Crawley [address] [facility] [address] To Whom It May Concern: 
 
Gerber Crawley is currently under the care of [facility]. He will return to work/school on: 11/7/20 with the following restrictions; no lifting greater than 10-15lbs. Avoid overhead activity and pushing/pulling. If there are questions or concerns please have the patient contact our office. Sincerely, Sienna Lainez NP

## 2020-10-27 NOTE — TELEPHONE ENCOUNTER
Pt states he needs the letter to state NO RESTRICTIONS. He said he will still abide by the restrictions that he needs to. He is the  and can assign those types of tasks to other persons. His work does not have a light duty option.

## 2020-10-28 NOTE — TELEPHONE ENCOUNTER
Pt now states that the letter with restrictions is fine, but he needs it to have an end date. Patients next f/u appt in 12/3/2020.

## 2020-12-03 ENCOUNTER — OFFICE VISIT (OUTPATIENT)
Dept: ORTHOPEDIC SURGERY | Age: 60
End: 2020-12-03
Payer: COMMERCIAL

## 2020-12-03 VITALS
WEIGHT: 245 LBS | HEART RATE: 108 BPM | DIASTOLIC BLOOD PRESSURE: 85 MMHG | SYSTOLIC BLOOD PRESSURE: 118 MMHG | OXYGEN SATURATION: 97 % | HEIGHT: 75 IN | RESPIRATION RATE: 19 BRPM | TEMPERATURE: 98.4 F | BODY MASS INDEX: 30.46 KG/M2

## 2020-12-03 DIAGNOSIS — Z98.1 S/P CERVICAL SPINAL FUSION: Primary | ICD-10-CM

## 2020-12-03 PROCEDURE — 72040 X-RAY EXAM NECK SPINE 2-3 VW: CPT | Performed by: NURSE PRACTITIONER

## 2020-12-03 PROCEDURE — 99024 POSTOP FOLLOW-UP VISIT: CPT | Performed by: NURSE PRACTITIONER

## 2020-12-03 RX ORDER — SEMAGLUTIDE 1.34 MG/ML
INJECTION, SOLUTION SUBCUTANEOUS
COMMUNITY
Start: 2020-11-29

## 2020-12-03 NOTE — LETTER
NOTIFICATION RETURN TO WORK / SCHOOL 
 
12/3/2020 9:11 AM 
 
Mr. John Royal [phone] [facility] [address] To Whom It May Concern: 
 
John Royal is currently under the care of [facility]. He will return to work on 12/07/2020, Full Duty. If there are questions or concerns please have the patient contact our office. Sincerely, Mike Zimmerman NP

## 2020-12-03 NOTE — PROGRESS NOTES
Chief complaint   Chief Complaint   Patient presents with    Neck Pain       History of Present Illness:  Darlyn Wise is a  61 y.o.  male who comes in today 6 weeks status post his anterior cervical decompression fusion from C 4-7 done on October 12, 2020. He states that his left arm pain is resolved as is the tingling in that arm. He states his balance is much better and that his posture is even straighter. He is also impressed that he can now see his blind spot on the left side when he turns his head. He is very happy with the outcome of the surgery. He continues to take gabapentin 303 times a day through his PCP for back issues. He is off the [address] now. 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 He is a non-smoker. He denies fever bowel bladder dysfunction. Physical Exam: The patient is a 59-year-old male well-developed well-nourished who is alert and oriented with a normal mood and affect. He has a full weightbearing nonantalgic gait. He has no assistive device. He has 5 out of 5 strength bilateral upper extremities. Negative Omkar's. He has normal tandem gait. Assessment and Plan: This is a patient who is 6 weeks out from his ACDF C4-C7. He is very happy with the outcome of the surgery. 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 We will see him back in 6 weeks sooner if needed.         Review of systems:    Past Medical History:   Diagnosis Date    Chronic low back pain     Degeneration of lumbar intervertebral disc     [facility] [address]     Diabetes mellitus ([facility] [address]     Essential hypertension     Hyperlipidemia     Hypertension     MRSA colonization 01/23/2020    nasal swab-pt was treated with Mupirocin    Shoulder pain      Past Surgical History:   Procedure Laterality Date    HX COLONOSCOPY      HX HERNIA REPAIR      HX OTHER SURGICAL Right 2005    knee surgery ACL    HX OTHER SURGICAL Right 02/2020    right knee replacement    PLASTIC SURGERY, NECK      fusion     Social History     Socioeconomic History    Marital status:      Spouse name: Not on file    Number of children: Not on file    Years of education: Not on file    Highest education level: Not on file   Occupational History    Not on file   Social Needs    Financial resource strain: Not on file    Food insecurity     Worry: Not on file     Inability: Not on file    Transportation needs     Medical: Not on file     Non-medical: Not on file   Tobacco Use    Smoking status: Never Smoker    Smokeless tobacco: Never Used   Substance and Sexual Activity    Alcohol use: No     Alcohol/week: 0.0 standard drinks    Drug use: No    Sexual activity: Not Currently   Lifestyle    Physical activity     Days per week: Not on file     Minutes per session: Not on file    Stress: Not on file   Relationships    Social connections     Talks on phone: Not on file     Gets together: Not on file     Attends Mormon service: Not on file     Active member of club or organization: Not on file     Attends meetings of clubs or organizations: Not on file     Relationship status: Not on file    Intimate partner violence     Fear of current or ex partner: Not on file     Emotionally abused: Not on file     Physically abused: Not on file     Forced sexual activity: Not on file   Other Topics Concern    Not on file   Social History Narrative    ** Merged History Encounter **          Family History   Problem Relation Age of Onset    Hypertension Mother     Diabetes Father     Heart Failure Father     Hypertension Father        Physical Exam:  Visit Vitals  /85 (BP 1 Location: Right arm, BP Patient Position: Sitting)   Pulse (!) 108 Comment: pt asymptomatic, NP awre   Temp 98.4 °F (36.9 °C) (Skin)   Resp 19   Ht 6' 3\" (1.905 m)   Wt 245 lb (111.1 kg)   SpO2 97% Comment: RA   BMI 30.62 kg/m²     Pain Scale: 1/10       has been . reviewed and is appropriate          Diagnoses and all orders for this visit:    1. S/P cervical spinal fusion  -     AMB POC XRAY, SPINE, CERVICAL; 2 OR 3            Follow-up and Dispositions    · Return in about 6 weeks (around 1/14/2021) for with NP Sameer.              We have informed Ame Elana to notify us for immediate appointment if he has any worsening neurogical symptoms or if an emergency situation presents, then call 911

## 2020-12-03 NOTE — PROGRESS NOTES
Ame Huitron presents today for   Chief Complaint   Patient presents with    Neck Pain       Is someone accompanying this pt? no    Is the patient using any DME equipment during OV? no    Depression Screening:  3 most recent PHQ Screens 10/26/2020   PHQ Not Done Patient Decline   Little interest or pleasure in doing things -   Feeling down, depressed, irritable, or hopeless -   Total Score PHQ 2 -       Coordination of Care:  1. Have you been to the ER, urgent care clinic since your last visit? no  Hospitalized since your last visit? no    2. Have you seen or consulted any other health care providers outside of the [address] [place] [address] since your last visit? Yes, pcp Include any pap smears or colon screening.  no

## 2021-01-11 ENCOUNTER — OFFICE VISIT (OUTPATIENT)
Dept: ORTHOPEDIC SURGERY | Age: 61
End: 2021-01-11
Payer: COMMERCIAL

## 2021-01-11 VITALS
OXYGEN SATURATION: 96 % | DIASTOLIC BLOOD PRESSURE: 77 MMHG | WEIGHT: 246 LBS | HEART RATE: 94 BPM | TEMPERATURE: 97.4 F | BODY MASS INDEX: 30.75 KG/M2 | SYSTOLIC BLOOD PRESSURE: 117 MMHG

## 2021-01-11 DIAGNOSIS — G95.9 CERVICAL MYELOPATHY (HCC): ICD-10-CM

## 2021-01-11 DIAGNOSIS — M50.00 HNP (HERNIATED NUCLEUS PULPOSUS) WITH MYELOPATHY, CERVICAL: ICD-10-CM

## 2021-01-11 DIAGNOSIS — Z98.1 S/P CERVICAL SPINAL FUSION: Primary | ICD-10-CM

## 2021-01-11 DIAGNOSIS — M48.02 CERVICAL SPINAL STENOSIS: ICD-10-CM

## 2021-01-11 PROCEDURE — 99024 POSTOP FOLLOW-UP VISIT: CPT | Performed by: NURSE PRACTITIONER

## 2021-01-11 NOTE — PROGRESS NOTES
Chief complaint   Chief Complaint   Patient presents with    Neck Pain       History of Present Illness:  Hodan Fermin is a  61 y.o.  male is in today almost 3 months out from his anterior cervical decompression fusion at C4-C7 done October 12, 2020. He continues to do very well with resolution of his left arm pain numbness and tingling. He does get occasional numbness and warmth in the back of his neck but it is off and on. He states his balance continues to be well. He does take gabapentin 300 mg 3 times a day through his PCP. 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 He denies fever bowel bladder dysfunction. Physical Exam: The patient is a 72-year-old male well-developed well-nourished who is alert and oriented with a normal mood and affect. He has a full weightbearing nonantalgic gait. He did not use any assistive device. He has 5 out of 5 strength bilateral lower extremities. Negative Omkar's. He has a normal tandem gait. I did inspect his posterior neck and I did not see anything unusual there. Assessment and Plan: Is a patient almost 3 months out from his anterior cervical decompression fusion at C4-C7. He is doing very well. We will see him back as needed.         Review of systems:    Past Medical History:   Diagnosis Date    Chronic low back pain     Degeneration of lumbar intervertebral disc     Diabetes ([address]     Diabetes mellitus ([address]     Essential hypertension     Hyperlipidemia     Hypertension     MRSA colonization 01/23/2020    nasal swab-pt was treated with Mupirocin    Shoulder pain      Past Surgical History:   Procedure Laterality Date    HX COLONOSCOPY      HX HERNIA REPAIR      HX OTHER SURGICAL Right 2005    knee surgery ACL    HX OTHER SURGICAL Right 02/2020    right knee replacement    [facility] PLASTIC SURGERY, NECK      fusion     Social History     Socioeconomic History    Marital status:      Spouse name: Not on file    Number of children: Not on file    Years of education: Not on file    Highest education level: Not on file   Occupational History    Not on file   Social Needs    Financial resource strain: Not on file    Food insecurity     Worry: Not on file     Inability: Not on file    Transportation needs     Medical: Not on file     Non-medical: Not on file   Tobacco Use    Smoking status: Never Smoker    Smokeless tobacco: Never Used   Substance and Sexual Activity    Alcohol use: No     Alcohol/week: 0.0 standard drinks    Drug use: No    Sexual activity: Not Currently   Lifestyle    Physical activity     Days per week: Not on file     Minutes per session: Not on file    Stress: Not on file   Relationships    Social connections     Talks on phone: Not on file     Gets together: Not on file     Attends [employer] service: Not on file     Active member of club or organization: Not on file     Attends meetings of clubs or organizations: Not on file     Relationship status: Not on file    Intimate partner violence     Fear of current or ex partner: Not on file     Emotionally abused: Not on file     Physically abused: Not on file     Forced sexual activity: Not on file   Other Topics Concern    Not on file   Social History Narrative    ** Merged History Encounter **          Family History   Problem Relation Age of Onset    Hypertension Mother     Diabetes Father     Heart Failure Father     Hypertension Father        Physical Exam:  Visit Vitals  /77 (BP 1 Location: Left arm, BP Patient Position: Sitting)   Pulse 94   Temp 97.4 °F (36.3 °C) (Temporal)   Wt 246 lb (111.6 kg)   SpO2 96%   BMI 30.75 kg/m²     Pain Scale: 3/10       has been . reviewed and is appropriate          Diagnoses and all orders for this visit:    1. S/P cervical spinal fusion    2. Cervical myelopathy (HCC)    3. Cervical spinal stenosis    4.  HNP (herniated nucleus pulposus) with myelopathy, cervical            Follow-up and Dispositions · Return if symptoms worsen or fail to improve.              We have informed Sen Amelia to notify us for immediate appointment if he has any worsening neurogical symptoms or if an emergency situation presents, then call 911

## 2021-10-21 ENCOUNTER — ANESTHESIA EVENT (OUTPATIENT)
Dept: ENDOSCOPY | Age: 61
End: 2021-10-21
Payer: COMMERCIAL

## 2021-10-22 ENCOUNTER — ANESTHESIA (OUTPATIENT)
Dept: ENDOSCOPY | Age: 61
End: 2021-10-22
Payer: COMMERCIAL

## 2021-11-12 NOTE — PERIOP NOTES
PRE-SURGICAL INSTRUCTIONS        Patient's Name:  Venus Valladares      Today's Date:  11/12/2021            Covid Testing Date and Time: 11/15 @ 1500    Surgery Date:  11/19/2021                1. Do NOT eat or drink anything, including candy, gum, or ice chips after midnight on 11/18, unless you have specific instructions from your surgeon or anesthesia provider to do so.  2. You may brush your teeth before coming to the hospital.  3. No smoking 24 hours prior to the day of surgery. 4. No alcohol 24 hours prior to the day of surgery. 5. No recreational drugs for one week prior to the day of surgery. 6. Leave all valuables, including money/purse, at home. 7. Remove all jewelry, nail polish, acrylic nails, and makeup (including mascara); no lotions powders, deodorant, or perfume/cologne/after shave on the skin. 8. Follow instruction for Hibiclens washes and CHG wipes from surgeon's office. 9. Glasses/contact lenses and dentures may be worn to the hospital.  They will be removed prior to surgery. 10. Call your doctor if symptoms of a cold or illness develop within 24-48 hours prior to your surgery. 11.  If you are having an outpatient procedure, please make arrangements for a responsible ADULT TO [address] [place] [address] and stay with you for 24 hours after your surgery. 12. ONE VISITOR in the hospital at this time for outpatient procedures. Exceptions may be made for surgical admissions, per nursing unit guidelines      Special Instructions:      Bring any pertinent legal medical records. Take these medications the morning of surgery with a sip of water: Amlodipine and Baclofen. Follow physician instructions about stopping anticoagulants. Stop Voltaren and Aspirin 11/12         On the day of surgery, come in the main entrance of [facility]. Let the  at the desk know you are there for surgery.   A staff member will come escort you to the surgical area on the second floor.    If you have any questions or concerns, please do not hesitate to call:     (Prior to the day of surgery) [facility] department:  [phone]   (Day of surgery) Pre-Op department:  [phone]    These surgical instructions were reviewed with Ivar Read during the [facility] phone call.

## 2021-11-15 ENCOUNTER — TRANSCRIBE ORDER (OUTPATIENT)
Dept: REGISTRATION | Age: 61
End: 2021-11-15

## 2021-11-15 ENCOUNTER — HOSPITAL ENCOUNTER (OUTPATIENT)
Dept: PREADMISSION TESTING | Age: 61
Discharge: HOME | End: 2021-11-15
Payer: COMMERCIAL

## 2021-11-15 DIAGNOSIS — K74.60 CIRRHOSIS (HCC): Primary | ICD-10-CM

## 2021-11-15 DIAGNOSIS — E66.3 OVERWEIGHT: ICD-10-CM

## 2021-11-15 DIAGNOSIS — K74.60 CIRRHOSIS (HCC): ICD-10-CM

## 2021-11-15 DIAGNOSIS — K76.0 FATTY LIVER DISEASE, NONALCOHOLIC: ICD-10-CM

## 2021-11-15 DIAGNOSIS — R10.84 GENERALIZED ABDOMINAL PAIN: ICD-10-CM

## 2021-11-15 PROCEDURE — U0003 INFECTIOUS AGENT DETECTION BY NUCLEIC ACID (DNA OR RNA); SEVERE ACUTE RESPIRATORY SYNDROME CORONAVIRUS 2 (SARS-COV-2) (CORONAVIRUS DISEASE [COVID-19]), AMPLIFIED PROBE TECHNIQUE, MAKING USE OF HIGH THROUGHPUT TECHNOLOGIES AS DESCRIBED BY CMS-2020-01-R: HCPCS

## 2021-11-16 LAB — SARS-COV-2, NAA: NOT DETECTED

## 2021-11-19 ENCOUNTER — HOSPITAL ENCOUNTER (OUTPATIENT)
Age: 61
Setting detail: OUTPATIENT SURGERY
Discharge: HOME | End: 2021-11-19
Attending: INTERNAL MEDICINE | Admitting: INTERNAL MEDICINE
Payer: COMMERCIAL

## 2021-11-19 VITALS
DIASTOLIC BLOOD PRESSURE: 74 MMHG | TEMPERATURE: 97.4 F | HEIGHT: 75 IN | HEART RATE: 69 BPM | OXYGEN SATURATION: 98 % | WEIGHT: 233.5 LBS | SYSTOLIC BLOOD PRESSURE: 121 MMHG | BODY MASS INDEX: 29.03 KG/M2 | RESPIRATION RATE: 12 BRPM

## 2021-11-19 LAB
GLUCOSE BLD STRIP.AUTO-MCNC: 113 MG/DL (ref 70–110)
GLUCOSE BLD STRIP.AUTO-MCNC: 122 MG/DL (ref 70–110)

## 2021-11-19 PROCEDURE — 2709999900 HC NON-CHARGEABLE SUPPLY: Performed by: INTERNAL MEDICINE

## 2021-11-19 PROCEDURE — 74011000250 HC RX REV CODE- 250: Performed by: NURSE ANESTHETIST, CERTIFIED REGISTERED

## 2021-11-19 PROCEDURE — 77030008565 HC TBNG SUC IRR ERBE -B: Performed by: INTERNAL MEDICINE

## 2021-11-19 PROCEDURE — 00731 ANES UPR GI NDSC PX NOS: CPT | Performed by: ANESTHESIOLOGY

## 2021-11-19 PROCEDURE — 74011250636 HC RX REV CODE- 250/636: Performed by: NURSE ANESTHETIST, CERTIFIED REGISTERED

## 2021-11-19 PROCEDURE — 82962 GLUCOSE BLOOD TEST: CPT

## 2021-11-19 PROCEDURE — 76060000031 HC ANESTHESIA FIRST 0.5 HR: Performed by: INTERNAL MEDICINE

## 2021-11-19 PROCEDURE — 76040000019: Performed by: INTERNAL MEDICINE

## 2021-11-19 PROCEDURE — 00731 ANES UPR GI NDSC PX NOS: CPT | Performed by: NURSE ANESTHETIST, CERTIFIED REGISTERED

## 2021-11-19 RX ORDER — INSULIN LISPRO 100 [IU]/ML
INJECTION, SOLUTION INTRAVENOUS; SUBCUTANEOUS ONCE
Status: DISCONTINUED | OUTPATIENT
Start: 2021-11-19 | End: 2021-11-19 | Stop reason: HOSPADM

## 2021-11-19 RX ORDER — SODIUM CHLORIDE, SODIUM LACTATE, POTASSIUM CHLORIDE, CALCIUM CHLORIDE 600; 310; 30; 20 MG/100ML; MG/100ML; MG/100ML; MG/100ML
75 INJECTION, SOLUTION INTRAVENOUS CONTINUOUS
Status: DISCONTINUED | OUTPATIENT
Start: 2021-11-19 | End: 2021-11-19 | Stop reason: HOSPADM

## 2021-11-19 RX ORDER — DEXTROSE 50 % IN WATER (D50W) INTRAVENOUS SYRINGE
25-50 AS NEEDED
Status: DISCONTINUED | OUTPATIENT
Start: 2021-11-19 | End: 2021-11-19 | Stop reason: HOSPADM

## 2021-11-19 RX ORDER — MAGNESIUM SULFATE 100 %
4 CRYSTALS MISCELLANEOUS AS NEEDED
Status: DISCONTINUED | OUTPATIENT
Start: 2021-11-19 | End: 2021-11-19 | Stop reason: HOSPADM

## 2021-11-19 RX ORDER — LIDOCAINE HYDROCHLORIDE 20 MG/ML
INJECTION, SOLUTION EPIDURAL; INFILTRATION; INTRACAUDAL; PERINEURAL AS NEEDED
Status: DISCONTINUED | OUTPATIENT
Start: 2021-11-19 | End: 2021-11-19 | Stop reason: HOSPADM

## 2021-11-19 RX ORDER — PROPOFOL 10 MG/ML
INJECTION, EMULSION INTRAVENOUS AS NEEDED
Status: DISCONTINUED | OUTPATIENT
Start: 2021-11-19 | End: 2021-11-19 | Stop reason: HOSPADM

## 2021-11-19 RX ORDER — LIDOCAINE HYDROCHLORIDE 10 MG/ML
0.1 INJECTION, SOLUTION EPIDURAL; INFILTRATION; INTRACAUDAL; PERINEURAL AS NEEDED
Status: DISCONTINUED | OUTPATIENT
Start: 2021-11-19 | End: 2021-11-19 | Stop reason: HOSPADM

## 2021-11-19 RX ADMIN — PROPOFOL 30 MG: 10 INJECTION, EMULSION INTRAVENOUS at 11:48

## 2021-11-19 RX ADMIN — FAMOTIDINE 20 MG: 10 INJECTION INTRAVENOUS at 11:25

## 2021-11-19 RX ADMIN — PROPOFOL 100 MG: 10 INJECTION, EMULSION INTRAVENOUS at 11:45

## 2021-11-19 RX ADMIN — LIDOCAINE HYDROCHLORIDE 60 MG: 20 INJECTION, SOLUTION EPIDURAL; INFILTRATION; INTRACAUDAL; PERINEURAL at 11:45

## 2021-11-19 RX ADMIN — PROPOFOL 50 MG: 10 INJECTION, EMULSION INTRAVENOUS at 11:46

## 2021-11-19 RX ADMIN — SODIUM CHLORIDE, SODIUM LACTATE, POTASSIUM CHLORIDE, AND CALCIUM CHLORIDE: 600; 310; 30; 20 INJECTION, SOLUTION INTRAVENOUS at 11:12

## 2021-11-19 NOTE — ANESTHESIA PREPROCEDURE EVALUATION
Relevant Problems   No relevant active problems       Anesthetic History   No history of anesthetic complications            Review of Systems / Medical History  Patient summary reviewed and pertinent labs reviewed    Pulmonary  Within defined limits                 Neuro/Psych   Within defined limits           Cardiovascular    Hypertension                   GI/Hepatic/Renal                Endo/Other    Diabetes    Arthritis     Other Findings              Physical Exam    Airway  Mallampati: II  TM Distance: > 6 cm  Neck ROM: decreased range of motion, short neck   Mouth opening: Normal     Cardiovascular  Regular rate and rhythm,  S1 and S2 normal,  no murmur, click, rub, or gallop  Rhythm: regular  Rate: normal         Dental  No notable dental hx       Pulmonary  Breath sounds clear to auscultation               Abdominal  GI exam deferred       Other Findings            Anesthetic Plan    ASA: 3  Anesthesia type: general          Induction: Intravenous  Anesthetic plan and risks discussed with: Patient

## 2021-11-19 NOTE — PROGRESS NOTES
[email or web address] [address]  [place], [address]      Brief Procedure Note    Vanessa Kearns  1960  [id number]    Date of Procedure: 11/19/2021    Preoperative diagnosis: Cirrhosis:  571.5 - K74.60  Fatty Liver non-alcoholic:  953.6 - E41.7  Generalized abdominal pain:  789.07 - R10.84  Overweight:  278.02 - E66.3    Postoperative diagnosis: hiatal hernia (45cm-46cm)  No varices seen    Type of Anesthesia: MAC (Monitored anesthesia care)    Description of findings: same as post op dx    Procedure: Procedure(s):  UPPER ENDOSCOPY    :  Dr. Amena Ulrich MD    Assistant(s): Endoscopy Technician-1: Giovanna Cruz  Endoscopy RN-1: Kaleigh Webb RN; Sameer Frey RN    Devices/implants/grafts/tissues/prosthesis: None    EBL:None    Specimens: * No specimens in log *    Findings: See printed and scanned procedure note    Complications: None    Dr. Amena Ulrich MD  11/19/2021  12:04 PM

## 2021-11-19 NOTE — H&P
[email or web address]  [phone]      GASTROENTEROLOGY Pre-Procedure H and P          Impression/Plan:   1. This patient is consented for an EGD for varices screen, with possible banding due to h/o cirrhosis      Chief Complaint: Varices screen due to h/o cirrhosis    HPI:  This is a patient who is having an EGD for varices screen, with possible banding due to h/o cirrhosis  PMH:   Past Medical History:   Diagnosis Date    Chronic low back pain     Degeneration of lumbar intervertebral disc     Diabetes ([address]     Essential hypertension     Hyperlipidemia     Hypertension     MRSA colonization 01/23/2020    nasal swab-pt was treated with Mupirocin    Shoulder pain        PSH:   Past Surgical History:   Procedure Laterality Date    HX ACL RECONSTRUCTION Right     HX CERVICAL FUSION      C5-7    HX COLONOSCOPY      HX HERNIA REPAIR      HX KNEE REPLACEMENT Right     NH PLASTIC SURGERY, NECK      fusion       Social HX:   Social History     Socioeconomic History    Marital status:      Spouse name: Not on file    Number of children: Not on file    Years of education: Not on file    Highest education level: Not on file   Occupational History    Not on file   Tobacco Use    Smoking status: Never Smoker    Smokeless tobacco: Never Used   Vaping Use    Vaping Use: Never used   Substance and Sexual Activity    Alcohol use: No     Alcohol/week: 0.0 standard drinks    Drug use: Never    Sexual activity: Not Currently   Other Topics Concern    Not on file   Social History Narrative    ** Merged History Encounter **          Social Determinants of Health     Financial Resource Strain:     Difficulty of Paying Living Expenses: Not on file   Food Insecurity:     Worried About Running Out of Food in the Last Year: Not on file    Sissy of Food in the Last Year: Not on file   Transportation Needs:     Lack of Transportation (Medical): Not on file    Lack of Transportation (Non-Medical):  Not on file   Physical Activity:     Days of Exercise per Week: Not on file    Minutes of Exercise per Session: Not on file   Stress:     Feeling of Stress : Not on file   Social Connections:     Frequency of Communication with Friends and Family: Not on file    Frequency of Social Gatherings with Friends and Family: Not on file    Attends Shinto Services: Not on file    Active Member of [address] [place] [address] or Organizations: Not on file    Attends Club or Organization Meetings: Not on file    Marital Status: Not on file   Intimate Partner Violence:     Fear of Current or Ex-Partner: Not on file    Emotionally Abused: Not on file    Physically Abused: Not on file    Sexually Abused: Not on file   Housing Stability:     Unable to Pay for Housing in the Last Year: Not on file    Number of [place] in the Last Year: Not on file    Unstable Housing in the Last Year: Not on file       FHX:   Family History   Problem Relation Age of Onset    Hypertension Mother     Diabetes Father     Heart Failure Father     Hypertension Father        Allergy:   Allergies   Allergen Reactions    Lisinopril Cough       Home Medications:     Medications Prior to Admission   Medication Sig    Ozempic 0.25 mg or 0.5 mg(2 mg/1.5 mL) sub-q pen STARING 11/13/20 AND EVERY FRIDAY AFTER THAT inject 0.5 milligrams subcutaneously every week    multivit-min/ferrous fumarate (MULTI VITAMIN PO) Take 1 Gum by mouth daily.  telmisartan (MICARDIS) 80 mg tablet Take 80 mg by mouth nightly.  finasteride (PROSCAR) 5 mg tablet Take 5 mg by mouth daily.  cyclobenzaprine HCl (FLEXERIL PO) Take 5 mg by mouth three (3) times daily as needed.  glimepiride (AMARYL) 2 mg tablet Take 2 mg by mouth every morning.  diclofenac EC (VOLTAREN) 75 mg EC tablet Take 75 mg by mouth two (2) times a day.  amlodipine besylate (AMLODIPINE PO) Take 5 mg by mouth daily.  tamsulosin (FLOMAX) 0.4 mg capsule Take 1 Cap by mouth daily (after dinner).     baclofen (LIORESAL) 10 mg tablet Take 10 mg by mouth nightly.  tadalafil (CIALIS) 5 mg tablet Take 5 mg by mouth as needed.  traMADol (ULTRAM) 50 mg tablet TAKE ONE TABLET BY MOUTH THREE TIMES A DAY AS NEEDED FOR PAIN    aspirin 81 mg chewable tablet Take 81 mg by mouth daily.  gabapentin (NEURONTIN) 300 mg capsule Take 300 mg by mouth every six (6) hours.  simvastatin (ZOCOR) 10 mg tablet Take 10 mg by mouth nightly.  glucose blood VI test strips (BLOOD GLUCOSE TEST) strip Use to test blood sugars  daily as directed    Blood-Glucose Meter (ONETOUCH VERIO IQ METER) monitoring kit Use to test blood sugars twice daily as directed       Review of Systems:     Constitutional: No fevers, chills, weight loss, fatigue. Skin: No rashes, pruritis, jaundice, ulcerations, erythema. HENT: No headaches, nosebleeds, sinus pressure, rhinorrhea, sore throat. Eyes: No visual changes, blurred vision, eye pain, photophobia, jaundice. Cardiovascular: No chest pain, heart palpitations. Respiratory: No cough, SOB, wheezing, chest discomfort, orthopnea. Gastrointestinal: Neg unless noted otherwise in H&P   Genitourinary: No dysuria, bleeding, discharge, pyuria. Musculoskeletal: No weakness, arthralgias, wasting. Endo: No sweats. Heme: No bruising, easy bleeding. Allergies: As noted. Neurological: Cranial nerves intact. Alert and oriented. Gait not assessed. Psychiatric:  No anxiety, depression, hallucinations. Visit Vitals  /75 (BP 1 Location: Right upper arm, BP Patient Position: At rest)   Pulse 69   Temp 97.5 °F (36.4 °C)   Resp 18   Ht 6' 3\" (1.905 m)   Wt 105.9 kg (233 lb 8 oz)   SpO2 98%   BMI 29.19 kg/m²       Physical Assessment:     constitutional: appearance: well developed, well nourished, normal habitus, no deformities, in no acute distress. skin: inspection: no rashes, ulcers, icterus or other lesions; no clubbing or telangiectasias.  palpation: no induration or subcutaneos nodules. eyes: inspection: normal conjunctivae and lids; no jaundice pupils: normal  ENMT: mouth: normal oral mucosa,lips and gums; good dentition. oropharynx: normal tongue, hard and soft palate; posterior pharynx without erithema, exudate or lesions. neck: thyroid: normal size, consistency and position; no masses or tenderness. respiratory: effort: normal chest excursion; no intercostal retraction or accessory muscle use. cardiovascular: abdominal aorta: normal size and position; no bruits. palpation: PMI of normal size and position; normal rhythm; no thrill or murmurs. abdominal: abdomen: normal consistency; no tenderness or masses. hernias: no hernias appreciated. liver: normal size and consistency. spleen: not palpable. rectal: hemoccult/guaiac: not performed. musculoskeletal: digits and nails: no clubbing, cyanosis, petechiae or other inflammatory conditions. gait: normal gait and station head and neck: normal range of motion; no pain, crepitation or contracture. spine/ribs/pelvis: normal range of motion; no pain, deformity or contracture. neurologic: cranial nerves: II-XII normal.   psychiatric: judgement/insight: within normal limits. memory: within normal limits for recent and remote events. mood and affect: no evidence of depression, anxiety or agitation. orientation: oriented to time, space and person. Basic Metabolic Profile   No results for input(s): NA, K, CL, CO2, BUN, GLU, CA, MG, PHOS in the last 72 hours. No lab exists for component: CREAT      CBC w/Diff    No results for input(s): WBC, RBC, HGB, HCT, MCV, MCH, MCHC, RDW, PLT, HGBEXT, HCTEXT, PLTEXT in the last 72 hours. No lab exists for component: MPV No results for input(s): GRANS, LYMPH, EOS, PRO, MYELO, METAS, BLAST in the last 72 hours. No lab exists for component: MONO, BASO     Hepatic Function   No results for input(s): ALB, TP, TBILI, AP, AML, LPSE in the last 72 hours.     No lab exists for component: DBILI, GPT, SGOT     Coags   No results for input(s): PTP, INR, APTT, INREXT in the last 72 hours. Terry Mitchell MD  [facility] [address]  Cell: [phone]  Direct pager: [phone]  [email or web address]  [email or web address]. [employer]

## 2021-11-19 NOTE — ANESTHESIA POSTPROCEDURE EVALUATION
Procedure(s):  UPPER ENDOSCOPY.     general    Anesthesia Post Evaluation      Multimodal analgesia: multimodal analgesia used between 6 hours prior to anesthesia start to PACU discharge  Patient location during evaluation: bedside  Patient participation: complete - patient participated  Level of consciousness: awake  Pain management: adequate  Airway patency: patent  Anesthetic complications: no  Cardiovascular status: stable  Respiratory status: acceptable  Hydration status: acceptable  Post anesthesia nausea and vomiting:  controlled  Final Post Anesthesia Temperature Assessment:  Normothermia (36.0-37.5 degrees C)      INITIAL Post-op Vital signs:   Vitals Value Taken Time   BP 90/53 11/19/21 1155   Temp 36.4 °C (97.6 °F) 11/19/21 1155   Pulse 67 11/19/21 1155   Resp 20 11/19/21 1155   SpO2 99 % 11/19/21 1155

## 2021-11-19 NOTE — DISCHARGE INSTRUCTIONS
Upper GI Endoscopy: What to Expect at [address] [place] [address]  After you have an endoscopy, you will stay at the hospital or clinic for 1 to 2 hours. This will allow the medicine to wear off. You will be able to go home after your doctor or nurse checks to make sure you are not having any problems. You may have to stay overnight if you had treatment during the test. You may have a sore throat for a day or two after the test.  This care sheet gives you a general idea about what to expect after the test.  How can you care for yourself at home? Activity  · Rest as much as you need to after you go home. · You should be able to go back to your usual activities the day after the test.  Diet  · Follow your doctor's directions for eating after the test.  · Drink plenty of fluids (unless your doctor has told you not to). Medications  · If you have a sore throat the day after the test, use an over-the-counter spray to numb your throat. Follow-up care is a key part of your treatment and safety. Be sure to make and go to all appointments, and call your doctor if you are having problems. It's also a good idea to know your test results and keep a list of the medicines you take. When should you call for help? Call 911 anytime you think you may need emergency care. For example, call if:  · You passed out (lost consciousness). · You cough up blood. · You vomit blood or what looks like coffee grounds. · You pass maroon or very bloody stools. Call your doctor now or seek immediate medical care if:  · You have trouble swallowing. · You have belly pain. · Your stools are black and tarlike or have streaks of blood. · You are sick to your stomach or cannot keep fluids down. Watch closely for changes in your health, and be sure to contact your doctor if:  · Your throat still hurts after a day or two. · You do not get better as expected. Where can you learn more?    Go to [employer]  Enter J454 in the search box to learn more about \"Upper GI Endoscopy: What to Expect at Home. \"   © 5453-8956 Healthwise, Incorporated. Care instructions adapted under license by [address] [employer] (which disclaims liability or warranty for this information). This care instruction is for use with your licensed healthcare professional. If you have questions about a medical condition or this instruction, always ask your healthcare professional. [address] any warranty or liability for your use of this information. Content Version: 06.5.292281; Current as of: November 14, 2014    DISCHARGE SUMMARY from Nurse     POST-PROCEDURE INSTRUCTIONS:    Call your Physician if you:  ? Observe any excess bleeding. ? Develop a temperature over 100.5o F.  ? Experience abdominal, shoulder or chest pain. ? Notice any signs of decreased circulation or nerve impairment to an extremity such as a change in color, persistent numbness, tingling, coldness or increase in pain. ? Vomit blood or you have nausea and vomiting lasting longer than 4 hours. ? Are unable to take medications. ? Are unable to urinate within 8 hours after discharge following general anesthesia or intravenous sedation. For the next 24 hours after receiving general anesthesia or intravenous sedation, or while taking prescription Narcotics, limit your activities:  ? Do NOT drive a motor vehicle, operate hazard machinery or power tools, or perform tasks that require coordination. The medication you received during your procedure may have some effect on your mental awareness. ? Do NOT make important personal or business decisions. The medication you received during your procedure may have some effect on your mental awareness. ? Do NOT drink alcoholic beverages. These drinks do not mix well with the medications that have been given to you. ? Upon discharge from the hospital, you must be accompanied by a responsible adult. ?  Resume your diet as directed by your physician. ? Resume medications as your physician has prescribed. ? Please give a list of your current medications to your Primary Care Provider. ? Please update this list whenever your medications are discontinued, doses are changed, or new medications (including over-the-counter products) are added. ? Please carry medication information at all times in case of emergency situations. These are general instructions for a healthy lifestyle:    No smoking/ No tobacco products/ Avoid exposure to second hand smoke.  Surgeon General's Warning:  Quitting smoking now greatly reduces serious risk to your health. Obesity, smoking, and a sedentary lifestyle greatly increase your risk for illness.  A healthy diet, regular physical exercise & weight monitoring are important for maintaining a healthy lifestyle   You may be retaining fluid if you have a history of heart failure or if you experience any of the following symptoms:  Weight gain of 3 pounds or more overnight or 5 pounds in a week, increased swelling in our hands or feet or shortness of breath while lying flat in bed. Please call your doctor as soon as you notice any of these symptoms; do not wait until your next office visit. Colorectal Screening   Colorectal cancer almost always develops from precancerous polyps (abnormal growths) in the colon or rectum. Screening tests can find precancerous polyps, so that they can be removed before they turn into cancer. Screening tests can also find colorectal cancer early, when treatment works best.  Yaz [place] Speak with your physician about when you should begin screening and how often you should be tested  Yaz [place]   Additional Information    Educational references and/or instructions provided during this visit included:    See attached. APPOINTMENTS:    Per MD Instruction. Discharge information has been reviewed with the patient. The patient verbalized understanding.   Patient Education        Jorge Hernia: Care Instructions  Your Care Instructions  A hiatal hernia occurs when part of the stomach bulges into the chest cavity. A hiatal hernia may allow stomach acid and juices to back up into the esophagus (acid reflux). This can cause a feeling of burning, warmth, heat, or pain behind the breastbone. This feeling may often occur after you eat, soon after you lie down, or when you bend forward, and it may come and go. You also may have a sour taste in your mouth. These symptoms are commonly known as heartburn or reflux. But not all hiatal hernias cause symptoms. Follow-up care is a key part of your treatment and safety. Be sure to make and go to all appointments, and call your doctor if you are having problems. It's also a good idea to know your test results and keep a list of the medicines you take. How can you care for yourself at home? · Take your medicines exactly as prescribed. Call your doctor if you think you are having a problem with your medicine. · Do not take aspirin or other nonsteroidal anti-inflammatory drugs (NSAIDs), such as ibuprofen (Advil, Motrin) or naproxen (Aleve), unless your doctor says it is okay. Ask your doctor what you can take for pain. · Your doctor may recommend over-the-counter medicine. For mild or occasional indigestion, antacids such as Tums, Gaviscon, Maalox, or Mylanta may help. Your doctor also may recommend over-the-counter acid reducers, such as famotidine (Pepcid AC), cimetidine (Tagamet HB), or omeprazole (Prilosec). Read and follow all instructions on the label. If you use these medicines often, talk with your doctor. · Change your eating habits. ? It's best to eat several small meals instead of two or three large meals. ? After you eat, wait 2 to 3 hours before you lie down. Late-night snacks aren't a good idea. ? Chocolate, mint, and alcohol can make heartburn worse. They relax the valve between the esophagus and the stomach. ?  Spicy foods, foods that have a lot of acid (like tomatoes and oranges), and coffee can make heartburn symptoms worse in some people. If your symptoms are worse after you eat a certain food, you may want to stop eating that food to see if your symptoms get better. · Do not smoke or chew tobacco.  · If you get heartburn at night, raise the head of your bed 6 to 8 inches by putting the frame on blocks or placing a foam wedge under the head of your mattress. (Adding extra pillows does not work.)  · Do not wear tight clothing around your middle. · Lose weight if you need to. Losing just 5 to 10 pounds can help. When should you call for help? Call your doctor now or seek immediate medical care if:    · You have new or worse belly pain.     · You are vomiting. Watch closely for changes in your health, and be sure to contact your doctor if:    · You have new or worse symptoms of indigestion.     · You have trouble or pain swallowing.     · You are losing weight.     · You do not get better as expected. Where can you learn more? Go to http://www.[employer].com/  Enter T074 in the search box to learn more about \"Hiatal Hernia: Care Instructions. \"  Current as of: February 10, 2021               Content Version: 13.0  © 2006-2021 Healthwise, Incorporated. Care instructions adapted under license by [employer] (which disclaims liability or warranty for this information). If you have questions about a medical condition or this instruction, always ask your healthcare professional. [place] [address] any warranty or liability for your use of this information.

## 2022-03-15 ENCOUNTER — TRANSCRIBE ORDER (OUTPATIENT)
Dept: SCHEDULING | Age: 62
End: 2022-03-15

## 2022-03-15 DIAGNOSIS — M51.36 OTHER INTERVERTEBRAL DISC DEGENERATION, LUMBAR REGION: Primary | ICD-10-CM

## 2022-03-19 PROBLEM — G95.9 CERVICAL MYELOPATHY (HCC): Status: ACTIVE | Noted: 2020-10-12

## 2022-03-28 ENCOUNTER — HOSPITAL ENCOUNTER (OUTPATIENT)
Dept: PHYSICAL THERAPY | Age: 62
Discharge: HOME | End: 2022-03-28
Payer: COMMERCIAL

## 2022-03-28 PROCEDURE — 97161 PT EVAL LOW COMPLEX 20 MIN: CPT

## 2022-03-28 PROCEDURE — 97535 SELF CARE MNGMENT TRAINING: CPT

## 2022-03-28 NOTE — PROGRESS NOTES
PHYSICAL THERAPY - DAILY TREATMENT NOTE      Patient Name: Cathy Casiano        Date: 3/28/2022  : 1960   YES Patient  Verified  Visit #:     Insurance: Payor: Ricardo Noble / Plan: [address] [place] [address] / Product Type: PPO /      In time: 3:40 Out time: 4:15   Total Treatment Time: 35     Medicare Time/[employer] Tracking (below)   Total Timed Codes (min):  10 1:1 Treatment Time:  10     TREATMENT AREA = Other low back pain [M54.59]     SUBJECTIVE    Pain Level (on 0 to 10 scale):  8  / 10   Medication Changes/New allergies or changes in medical history, any new surgeries or procedures? NO    If yes, update Summary List   Subjective Functional Status/Changes:  []  No changes reported       See Plan of Care       OBJECTIVE    10 min Self Care: HEP, activity modification   Rationale:    increase ROM and increase strength to improve the patients ability to perform ADLs    Billed With/As:   [] TE   [] TA   [] Neuro   [x] Self Care Patient Education: [x] Review HEP    [] Progressed/Changed HEP based on:   [] positioning   [] body mechanics   [] transfers   [] heat/ice application    [] other:      Other Objective/Functional Measures:    See Plan of Care     Post Treatment Pain Level (on 0 to 10) scale:   8  / 10     ASSESSMENT    Assessment/Changes in Function:     See Plan of Care     []  See Progress Note/Recertification   Patient will continue to benefit from skilled PT services to modify and progress therapeutic interventions, address functional mobility deficits, address ROM deficits, address strength deficits, analyze and address soft tissue restrictions, analyze and cue movement patterns, analyze and modify body mechanics/ergonomics and assess and modify postural abnormalities to attain remaining goals. to attain remaining goals.    Progress toward goals / Updated goals:    See Plan of Care     PLAN    [x]  Upgrade activities as tolerated YES Continue plan of care   []  Discharge due to :    []  Other:      Therapist: Nik Garcia, PT    Date: 3/28/2022 Time: 4:16 PM     Future Appointments   Date Time Provider Sathya Giulia   4/12/2022  2:30 PM Avni Prado, PT [facility] [id number]   4/15/2022  8:00 AM Avni Prado, PT [facility]   4/18/2022  4:30 PM Vicky Conti, PT [facility]   4/22/2022 11:30 AM Vicky Conti, PT [facility]   4/25/2022  4:30 PM Vicky Conti, PT [facility]   4/29/2022 10:45 AM Vicky Conti, PT [facility]   5/2/2022  4:30 PM Vicky Conti, PT [facility]   5/6/2022  8:00 AM Avni Prado, PT [facility]   5/9/2022  4:30 PM Vicky Conti, PT [facility]   5/13/2022  8:00 AM Freddie Brenner, PT [facility]

## 2022-03-28 NOTE — PROGRESS NOTES
[address], [facility] 201,[facility], [facility] - Phone: [phone]  Fax: [phone] OF [facility] / [address]  Patient Name: Jessica Villalobos : 1960   Medical   Diagnosis: Other low back pain [M54.59] Treatment Diagnosis: Left Lumbar Radiculopathy   Onset Date:      Referral Source: Anat Balderas NP Start of Care [facility]): 3/28/2022   Prior Hospitalization: See medical history Provider #: [id number]   Prior Level of Function: Hx intermittent LBP   Comorbidities: Cervical Fusion , Right TKR , DM, OA HTN, hx CVA   Medications: Verified on Patient Summary List   The Plan of Care and following information is based on the information from the initial evaluation.   ===========================================================================================  Assessment / liu information:  Jessica Villalobos is a 64 y.o.  yo male with Dx of Other low back pain [M54.59]. Patient reports insidious onset of symptoms increasing over the last year. MRI scheduled 22. Patient currently rates pain as 10/10 at worst, 8/10 at best, primarily located at lumbar and anterior left LE along L2-3 with pain, tingling and numbness reported. Patient complains of difficulty and increase pain with most marked symptoms upon rising in AM; also with sitting > 1 hour, standing >15 minutes and walking > 30 minutes, bending and supine lying. Objective Findings:  Lumbar AROM: limited 75% all planes with reports of increased lumbar pain and left anterior thigh pain. Manual Muscle Testing: Left LE strength grossly 4/5 throughout with cogwheeling present and reports of increased pain. Special Test: Patient reports some relief with Prednisone; currently on second course of treatment. Patient presenting with signs and symptoms consistent with acute nerve impingement along left L2-3 nerve root.  Unable to centralize symptoms with flexion or extension based therex. FOTO Score= 28 points. Pt instructed in HEP and will f/u in clinic for PT.  ===========================================================================================  Eval Complexity: History HIGH Complexity :3+ comorbidities / personal factors will impact the outcome/ POC ;  Examination  HIGH Complexity : 4+ Standardized tests and measures addressing body structure, function, activity limitation and / or participation in recreation ; Presentation LOW Complexity : Stable, uncomplicated ;  Decision Making MEDIUM Complexity : FOTO score of 26-74; Overall Complexity LOW   Problem List: pain affecting function, decrease ROM, decrease strength, impaired gait/ balance, decrease ADL/ functional abilitiies and decrease activity tolerance   Treatment Plan may include any combination of the following: Therapeutic exercise, Therapeutic activities, Neuromuscular re-education, Physical agent/modality, Manual therapy and Patient education  Patient / Family readiness to learn indicated by: asking questions, trying to perform skills and interest  Persons(s) to be included in education: patient (P)  Barriers to Learning/Limitations: None  Measures taken, if barriers to learning:    Patient Goal (s): \"I would like to be free of the pain. \"   Patient self reported health status: fair  Rehabilitation Potential: good   Short Term Goals: To be accomplished in  2  weeks:  1. Patient will increase FOTO Score to 38 points to improve tolerance to ADLs. 2. Patient will achieve +2 on GROC to improve tolerance to ADLs. 3. Patient will report 4/10 pain at worst to improve tolerance to ADLs.  Long Term Goals: To be accomplished in  4  weeks:  1. Patient will increase FOTO Score to 47 points to improve tolerance to ADLs. 2. Patient will achieve +4 on GROC to improve tolerance to ADLs. 3. Patient will report 2/10 pain at worst to improve tolerance to ADLs.   Frequency / Duration: Patient to be seen  2  times per week for 4  weeks:  Patient / Caregiver education and instruction: exercises  Therapist Signature: Mary Gagnon PT Date: 2/95/2732   Certification Period: n/a Time: 4:22 PM   ===========================================================================================  I certify that the above Physical Therapy Services are being furnished while the patient is under my care. I agree with the treatment plan and certify that this therapy is necessary. Physician Signature:        Date:       Time:                                        Rudy Gambino NP  Please sign and return to [facility] AT [facility] [place], [facility]. or you may fax the signed copy to [phone]. Thank you.

## 2022-04-12 ENCOUNTER — HOSPITAL ENCOUNTER (OUTPATIENT)
Dept: PHYSICAL THERAPY | Age: 62
Discharge: HOME | End: 2022-04-12
Payer: COMMERCIAL

## 2022-04-12 PROCEDURE — 97535 SELF CARE MNGMENT TRAINING: CPT | Performed by: PHYSICAL THERAPIST

## 2022-04-12 PROCEDURE — 97140 MANUAL THERAPY 1/> REGIONS: CPT | Performed by: PHYSICAL THERAPIST

## 2022-04-12 NOTE — PROGRESS NOTES
[facility] PHYSICAL THERAPY - DAILY TREATMENT NOTE    Patient Name: David Seen        Date: 2022  : 1960   yes Patient  Verified  Visit #:     Insurance: Payor: Drake Rodgers / Plan: [address] [place] [address] / Product Type: PPO /      In time: 230 Out time: 300   Total Treatment Time: 30     Medicare/[facility] Time Tracking (below)   Total Timed Codes (min):  30 1:1 Treatment Time:  30     TREATMENT AREA =  Other low back pain [M54.59]    SUBJECTIVE  Pain Level (on 0 to 10 scale):  8  / 10   Medication Changes/New allergies or changes in medical history, any new surgeries or procedures?    no  If yes, update Summary List   Subjective Functional Status/Changes:  []  No changes reported     The insurance company denied my mri until I see Dr. Lennox Young on Monday. OBJECTIVE      15 min Manual Therapy: Mfr/stm ls paraspinals, L glute med/piri   Rationale:      decrease pain, increase ROM and increase tissue extensibility to improve patient's ability to complete adls  The manual therapy interventions were performed at a separate and distinct time from the therapeutic activities interventions.       15 min Self Care: Hep, poc, activity modification    Rationale:    increase ROM and increase strength to improve the patients ability to complete adls    Billed With/As:   [] TE   [] TA   [] Neuro   [] Self Care Patient Education: [x] Review HEP    [] Progressed/Changed HEP based on:   [] positioning   [] body mechanics   [] transfers   [] heat/ice application    [] other:      Other Objective/Functional Measures:    Demo verbal understanding to sc  ttp throughout L paraspinals and L glute med     Post Treatment Pain Level (on 0 to 10) scale:   8   10     ASSESSMENT  Assessment/Changes in Function:     No adverse reactions following session      []  See Progress Note/Recertification   Patient will continue to benefit from skilled PT services to modify and progress therapeutic interventions, address functional mobility deficits, address ROM deficits, address strength deficits, analyze and address soft tissue restrictions, analyze and cue movement patterns, analyze and modify body mechanics/ergonomics, assess and modify postural abnormalities and instruct in home and community integration to attain remaining goals.    Progress toward goals / Updated goals:    No significant changes towards pain goals     PLAN  []  Upgrade activities as tolerated yes Continue plan of care   []  Discharge due to :    []  Other:      Therapist: Dm Douglass PT    Date: 4/12/2022 Time: 3:54 PM     Future Appointments   Date Time Provider Sathya Platt   4/15/2022  8:00 AM Barry Delgadillo, PT [facility]   4/18/2022  4:30 PM Andres Rankin, [facility]   4/19/2022  9:45 AM Suzie Baltazar MD [address] [place] [address]   4/22/2022 11:30 AM Andres Rankin, PT [facility]   4/25/2022  4:30 PM Andres Rankin, PT [facility]   4/29/2022 10:45 AM Andres Rankin, PT [facility]   5/2/2022  4:30 PM Andres Rankin, [facility]   5/6/2022  8:00 AM Barry Delgadillo, PT [facility]   5/9/2022  4:30 PM Andres Rankin, [facility]   5/13/2022  8:00 AM Jc Brenner, [facility]

## 2022-04-15 ENCOUNTER — HOSPITAL ENCOUNTER (OUTPATIENT)
Dept: PHYSICAL THERAPY | Age: 62
Discharge: HOME | End: 2022-04-15
Payer: COMMERCIAL

## 2022-04-15 NOTE — PROGRESS NOTES
.[facility]  [address] [place] [address] 201,[facility], 70 [facility] - Phone: [phone]  Fax: [phone]  PROGRESS NOTE  Patient Name: Cass Bautista : 1960   Treatment/Medical Diagnosis: Other low back pain [M54.59]   Referral Source: EDWARD Joshi     Date of Initial Visit: 3/28/22 Attended Visits: 2 Missed Visits: 0     SUMMARY OF TREATMENT  Pt was seen for IE and 1 follow up visit with treatment consisting of Miguel approach for LBP, manual therapy, instruction on hep and activity modification. CURRENT STATUS  Pt is having progressively worse L LE sx. He was on his 3rd round of Medrol Dose pack with no improvement. In fact he stopped recent pack due to adverse side effects. He has constant L leg sx along posterior lateral aspect to knee. +SLR at 45 and slump test. Attempted Miguel neutral positioning and this even increased his leg sx. He arrived to his 3rd session but antalgic gait, reduced l4 myotome and l4/5 dermatome. He is a scheduled appt with Dr. Radha Yusuf on 22 with possible MRI. I requested him to hold on therapy until that time due to possible irreducible derangement. Will follow up after that consult to determine next step in poc      RECOMMENDATIONS  Await spinal consult due to progressive leg pain and weakness  If you have any questions/comments please contact us directly at [phone]. Thank you for allowing us to assist in the care of your patient.     Therapist Signature: Ziggy Fishman, PT Date: 4/15/2022     Time: 8:20 AM   NOTE TO PHYSICIAN:  PLEASE COMPLETE THE ORDERS BELOW AND FAX TO   [facility]: [phone]  If you are unable to process this request in 24 hours please contact our office: [phone]    ___ I have read the above report and request that my patient continue as recommended.   ___ I have read the above report and request that my patient continue therapy with the following changes/special instructions:_________________________________________________________   ___ I have read the above report and request that my patient be discharged from therapy.      Physician Signature:        Date:       Time:                                 Eloisa Singh NP

## 2022-04-18 ENCOUNTER — APPOINTMENT (OUTPATIENT)
Dept: PHYSICAL THERAPY | Age: 62
End: 2022-04-18
Payer: COMMERCIAL

## 2022-04-19 ENCOUNTER — HOSPITAL ENCOUNTER (OUTPATIENT)
Dept: GENERAL RADIOLOGY | Age: 62
Discharge: HOME | End: 2022-04-19
Payer: COMMERCIAL

## 2022-04-19 ENCOUNTER — OFFICE VISIT (OUTPATIENT)
Dept: ORTHOPEDIC SURGERY | Age: 62
End: 2022-04-19
Payer: COMMERCIAL

## 2022-04-19 VITALS
TEMPERATURE: 98 F | HEIGHT: 75 IN | WEIGHT: 248 LBS | HEART RATE: 81 BPM | OXYGEN SATURATION: 97 % | BODY MASS INDEX: 30.84 KG/M2

## 2022-04-19 DIAGNOSIS — M54.16 LUMBAR RADICULOPATHY: Primary | ICD-10-CM

## 2022-04-19 DIAGNOSIS — M25.552 LEFT HIP PAIN: ICD-10-CM

## 2022-04-19 DIAGNOSIS — M54.16 LUMBAR RADICULOPATHY: ICD-10-CM

## 2022-04-19 PROCEDURE — 99213 OFFICE O/P EST LOW 20 MIN: CPT | Performed by: PHYSICAL MEDICINE & REHABILITATION

## 2022-04-19 PROCEDURE — 73502 X-RAY EXAM HIP UNI 2-3 VIEWS: CPT

## 2022-04-19 RX ORDER — MELOXICAM 15 MG/1
15 TABLET ORAL DAILY
Qty: 30 TABLET | Refills: 2 | Status: SHIPPED | OUTPATIENT
Start: 2022-04-19

## 2022-04-19 NOTE — LETTER
4/19/2022    Patient: Bigg Mendoza   YOB: 1960   Date of Visit: 4/19/2022     Gustavo Mario [address] [facility] [address] [place] [phone]  Via Fax: [phone]    Dear Anita Price DO,      Thank you for referring Mr. Jimy Magana to [address] [place] [address] for evaluation. My notes for this consultation are attached. If you have questions, please do not hesitate to call me. I look forward to following your patient along with you.       Sincerely,    Christiana Leggett MD

## 2022-04-19 NOTE — PROGRESS NOTES
[address], [address]  [place]  Phone: [phone]  Fax: [phone]        Marcelina Colon  : 1960  PCP: Jazmín Mahmood DO  2022    PROGRESS NOTE      HISTORY OF PRESENT ILLNESS  Ezequiel Carpenter is a 64 y.o. male who was seen as a new patient 2020 with c/o neck pain with paraesthesia radiating into the left shoulder in a C4 distribution x 7 months. Pt denies radiation further into the LUE or hand. He has attended PT with dry needling (19-10/10/19; [place] [facility]) with minimal benefit. Pt notes that he has to sleep in certain positions or it exacerbates his symptoms. He denies limited ROM of his left shoulder. Pt reports some episodes of functional bladder incontinence that he has not yet addressed with his urologist. He currently takes Flomax for his prostate. He denies bowel incontinence or paraesthesia or weakness in his lower extremities. Pt was previously seen 16 as a new patient with c/o lumbar and cervical pain that appeared to be myofascial pain and due to facet syndrome. He had completed several sessions of cervical RFA prior to moving to the area, so he was referred to Dr. Madelyn Tello to complete the process. He tried lumbar KRAIG and RFA without benefit. He tried Tramadol, Gabapentin, and Baclofen with mild relief. He was also referred to PT and prescribed diclofenac and Tramadol. Cervical spine MRI dated 10/29/19 reviewed. Per report, C3-4: Mild disc bulging and moderate left facet joint arthrosis moderately narrowing the left neural foramen. C4-5: Left disc protrusion and uncovertebral joint hypertrophy narrowing the neural canal and left neural foramen. C5-6: large left disc protrusion and severe left uncovertebral joint hypertrophy narrowing the neural canal and left neural foramen. C6-7: Moderate disc extrusion mildly narrowing the neural canal. He works as a .  He went to see [facility] for cervical KRAIG (Left C5-6 on 3/3/2020 with 85% relief for a few weeks; left C5-6 TFESI with 100% benefit on 4/17/2020until 6/13/2020). He also took Gabapentin and Tramadol. He then had a left cervical MBB with 50% benefit, so he proceeded with the RFA. However, he feels as if after the RFA, he has had pain in the neck and upper back with cervical flexion. His wife has noticed that his head sits lower/more forward than previously. Aleksandar Paula comes in to the office today for f/u. He is s/p ACDF C4-7 (10/12/2020 by Dr. Leif Painting). Today, he c/o low back pain radiating into the LLE, progressive x 6 months, especially the last 3. He has difficulty getting out of bed in the morning due to his pain. It sometimes takes a few hours before he feels comfortable putting full weight on his LLE. He is on his third round of Prednisone over the last 3 months. The first one was effective, but the last two have not provided as much relief. He has been in PT (3/28-4/15/22; [facility] but they discharged him as he had pain with some movements, and they wanted him to be seen by a spine specialist.    Pain Score: 7/10. PmHx: s/p ACDF C4-7    ASSESSMENT  Aleksandar Paula is a 64 y.o. male with c/o low back pain radiating into the anterior LLE. His symptoms are likely due to a left hip pathology vs upper lumbar radiculopathy. He is neurologically intact. PLAN  1. Lumbar MRI - low back pain radiating into the LLE, despite >6 weeks conservative tx including medications and PT; evaluate left-sided upper lumbar radiculopathy  2. Referral to Dr. Marty Mayers for left hip evaluation  3. Left hip XR      Pt will f/u after MRI (~4 weeks); also with Dr. Marty Mayers for left hip eval or sooner as needed. Diagnoses and all orders for this visit:    1. Lumbar radiculopathy    2.  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 MEDICATIONS      Current Outpatient Medications   Medication Sig Dispense Refill    Ozempic 0.25 mg or 0.5 mg(2 mg/1.5 mL) sub-q pen STARING 11/13/20 AND EVERY FRIDAY AFTER THAT inject 0.5 milligrams subcutaneously every week      multivit-min/ferrous fumarate (MULTI VITAMIN PO) Take 1 Gum by mouth daily.  glucose blood VI test strips (BLOOD GLUCOSE TEST) strip Use to test blood sugars  daily as directed      Blood-Glucose Meter (ONETOUCH VERIO IQ METER) monitoring kit Use to test blood sugars twice daily as directed      telmisartan (MICARDIS) 80 mg tablet Take 80 mg by mouth nightly.  finasteride (PROSCAR) 5 mg tablet Take 5 mg by mouth daily.  cyclobenzaprine HCl (FLEXERIL PO) Take 5 mg by mouth three (3) times daily as needed.  glimepiride (AMARYL) 2 mg tablet Take 2 mg by mouth every morning.  diclofenac EC (VOLTAREN) 75 mg EC tablet Take 75 mg by mouth two (2) times a day.  amlodipine besylate (AMLODIPINE PO) Take 5 mg by mouth daily.  baclofen (LIORESAL) 10 mg tablet Take 10 mg by mouth nightly.  traMADol (ULTRAM) 50 mg tablet TAKE ONE TABLET BY MOUTH THREE TIMES A DAY AS NEEDED FOR PAIN 90 Tab 0    aspirin 81 mg chewable tablet Take 81 mg by mouth daily.  gabapentin (NEURONTIN) 300 mg capsule Take 300 mg by mouth every six (6) hours. 1    simvastatin (ZOCOR) 10 mg tablet Take 10 mg by mouth nightly. 1    tamsulosin (FLOMAX) 0.4 mg capsule Take 1 Cap by mouth daily (after dinner).  (Patient not taking: Reported on 4/19/2022) 90 Cap 3    tadalafil (CIALIS) 5 mg tablet Take 5 mg by mouth as needed. (Patient not taking: Reported on 4/19/2022)          ALLERGIES    Allergies   Allergen Reactions    Lisinopril Cough          SOCIAL HISTORY    Social History     Socioeconomic History    Marital status:    Tobacco Use    Smoking status: Never Smoker    Smokeless tobacco: Never Used   Vaping Use    Vaping Use: Never used   Substance and Sexual Activity    Alcohol use: No     Alcohol/week: 0.0 standard drinks    Drug use: Never    Sexual activity: Not Currently   Social History Narrative    ** Merged History Encounter **            FAMILY HISTORY    Family History   Problem Relation Age of Onset    Hypertension Mother     Diabetes Father     Heart Failure Father     Hypertension Father          REVIEW OF SYSTEMS  Review of Systems   Constitutional: Negative for chills, fever and weight loss. Respiratory: Negative for shortness of breath. Cardiovascular: Negative for chest pain. Gastrointestinal: Negative for constipation. Negative for fecal incontinence    Genitourinary: Negative for dysuria. Negative for urinary incontinence   Musculoskeletal: Positive for back pain. Skin: Negative for rash. Neurological: Positive for tingling ( LLE). Negative for dizziness, tremors, focal weakness and headaches. Endo/Heme/Allergies: Does not bruise/bleed easily. Psychiatric/Behavioral: The patient does not have insomnia. PHYSICAL EXAMINATION  Visit Vitals  Pulse 81   Temp 98 °F (36.7 °C) (Temporal)   Ht 6' 3\" (1.905 m)   Wt 248 lb (112.5 kg)   SpO2 97%   BMI 31.00 kg/m²       Pain Assessment  4/19/2022   Location of Pain Back   Location Modifiers (No Data)   Severity of Pain 7   Quality of Pain Other (Comment); Sharp   Quality of Pain Comment sharp   Duration of Pain Persistent   Frequency of Pain Constant   Aggravating Factors Other (Comment)   Aggravating Factors Comment sleeping and getting up   Limiting Behavior Yes   Relieving Factors Nothing   Relieving Factors Comment -   Result of Injury No           Constitutional:  Well developed, well nourished, in no acute distress. Psychiatric: Affect and mood are appropriate. Integumentary: No rashes or abrasions noted on exposed areas. SPINE/MUSCULOSKELETAL EXAM    Cervical spine:  Neck is midline. Normal muscle tone. No focal atrophy is noted. ROM pain free. Shoulder ROM intact.   No tenderness to palpation. Positive Spurling's sign on the left. Negative Tinel's sign. Negative Connor's sign.                                                                                                                             Sensation in the bilateral arms grossly intact to light touch.     Updates 8/25/2020:  Cervical flexion  Tenderness to palpation of cervical paraspinals     Updates 4/19/22:  Pain with internal rotation of the left hip  Negative log roll test on the left  Tenderness of left buttock  Positive femoral stretch test on the left  Intact sensation in BLE    MOTOR:      Biceps  Triceps Deltoids Wrist Ext Wrist Flex Hand Intrin   Right 5/5 5/5 5/5 5/5 5/5 5/5   Left 5/5 5/5 5/5 5/5 5/5 5/5             Hip Flex  Quads Hamstrings Ankle DF EHL Ankle PF   Right 5/5 5/5 5/5 5/5 5/5 5/5   Left 5/5 5/5 5/5 5/5 5/5 5/5     DTRs are 2+ biceps, triceps, brachioradialis, patella, and Achilles.     Negative Straight Leg raise. Squat not tested. No difficulty with tandem gait.      Ambulation without assistive device. FWB.       RADIOGRAPHS  Cervical MRI images taken on 10/25/19 personally reviewed with patient:  Alignment: Within normal limits. Vertebral bodies: No compression fractures. Spinal cord: Deformed in shape by degenerative disc disease. No evident intrinsic spinal cord abnormality. Craniocervical junction: Within normal limits. C1-2: Within normal limits. C3-4:Normal disc height. Moderate disc desiccation.  Mild disc bulging and bilateral uncovertebral joint hypertrophy. Moderate left facet joint arthrosis and neural foraminal stenosis. Anterior-posterior dimension of the thecal sac at the midline measures 11 mm. C4-5: Moderate disc narrowing and desiccation. Diffuse disc bulging with accompanying osteophytes. Moderate left disc protrusion. Mild-moderate right and moderately severe left uncovertebral joint hypertrophy and neural foraminal stenosis. Anterior-posterior dimension of the thecal sac at the midline measures 9.5 mm. C5-6: Moderate disc narrowing and desiccation. Moderately large left disc protrusion with accompanying osteophytes and severe left uncovertebral joint hypertrophy mild-moderately narrowing the neural canal and severely narrowing the left neural foramen. Moderate right disc protrusion with accompanying osteophytes and moderate right uncovertebral joint hypertrophy. C6-7: Mild disc narrowing and desiccation. Moderate central disc extrusion, eccentric slightly to the right of midline. Mild right uncovertebral joint hypertrophy and neural foraminal stenosis. Anterior-posterior dimension if the thecal sac at the midline measures 9 mm. Other levels show normal disc height and hydration, no disc protrusion, normal appearance of the facet joints, and no nerve root impingement. Upper thoracic spine: Within normal limits.     IMPRESSION:  C3-4: Mild disc bulging and moderate left facet joint arthrosis moderately narrowing the left neural foramen. C4-5: Left disc protrusion and uncovertebral joint hypertrophy narrowing the neural canal and left neural foramen. C5-6: large left disc protrusion and severe left uncovertebral joint hypertrophy narrowing the neural canal and left neural foramen. C6-7: Moderate disc extrusion mildly narrowing the neural canal.     Cervical XR images taken on 8/16/19 personally reviewed with patient:  AP, lateral,  both oblique, and odontoid views are obtained.  Disc  space narrowing is present at the C4-C5, C5-C6 and C6-C7 levels. Discal spurring  is present at all levels. No significant neural foraminal narrowing is seen. There is no fracture or subluxation. Prevertebral soft tissues and predental  space are normal.     IMPRESSION  Impression:      Multilevel degenerative disc disease and osteoarthritic changes. No acute  Abnormalities.      15 minutes of face-to-face contact were spent with the patient during today's visit extensively discussing symptoms and treatment plan. All questions were answered. More than half of this visit today was spent on counseling.      Written by Aislinn Hastings as dictated by Flory Irby MD

## 2022-04-19 NOTE — PROGRESS NOTES
Cass Bautista presents today for   Chief Complaint   Patient presents with    Back Pain     lower       Is someone accompanying this pt? no    Is the patient using any DME equipment during OV? no    Depression Screening:  3 most recent PHQ Screens 10/26/2020   PHQ Not Done Patient Decline   Little interest or pleasure in doing things -   Feeling down, depressed, irritable, or hopeless -   Total Score PHQ 2 -       Learning Assessment:  No flowsheet data found. Abuse Screening:  Abuse Screening Questionnaire 4/19/2022   Do you ever feel afraid of your partner? N   Are you in a relationship with someone who physically or mentally threatens you? N   Is it safe for you to go home? Y       Fall Risk  Fall Risk Assessment, last 12 mths 4/19/2022   Able to walk? Yes   Fall in past 12 months? 0   Do you feel unsteady? 1   Are you worried about falling 1   Number of falls in past 12 months 0       OPIOID RISK TOOL  No flowsheet data found. Coordination of Care:  1. Have you been to the ER, urgent care clinic since your last visit? no  Hospitalized since your last visit? no    2. Have you seen or consulted any other health care providers outside of the [address] [place] [address] since your last visit? nono Include any pap smears or colon screening.  no

## 2022-04-21 ENCOUNTER — TELEPHONE (OUTPATIENT)
Dept: PHYSICAL THERAPY | Age: 62
End: 2022-04-21

## 2022-04-22 ENCOUNTER — APPOINTMENT (OUTPATIENT)
Dept: PHYSICAL THERAPY | Age: 62
End: 2022-04-22
Payer: COMMERCIAL

## 2022-04-25 ENCOUNTER — APPOINTMENT (OUTPATIENT)
Dept: PHYSICAL THERAPY | Age: 62
End: 2022-04-25
Payer: COMMERCIAL

## 2022-04-29 ENCOUNTER — APPOINTMENT (OUTPATIENT)
Dept: PHYSICAL THERAPY | Age: 62
End: 2022-04-29
Payer: COMMERCIAL

## 2022-04-30 ENCOUNTER — HOSPITAL ENCOUNTER (OUTPATIENT)
Age: 62
Discharge: HOME | End: 2022-04-30
Attending: PHYSICAL MEDICINE & REHABILITATION
Payer: COMMERCIAL

## 2022-04-30 PROCEDURE — 72148 MRI LUMBAR SPINE W/O DYE: CPT

## 2022-05-02 ENCOUNTER — APPOINTMENT (OUTPATIENT)
Dept: PHYSICAL THERAPY | Age: 62
End: 2022-05-02

## 2022-05-04 ENCOUNTER — TELEPHONE (OUTPATIENT)
Dept: ORTHOPEDIC SURGERY | Age: 62
End: 2022-05-04

## 2022-05-04 DIAGNOSIS — M54.16 LUMBAR RADICULOPATHY: Primary | ICD-10-CM

## 2022-05-04 RX ORDER — PREGABALIN 75 MG/1
75 CAPSULE ORAL 2 TIMES DAILY
Qty: 60 CAPSULE | Refills: 1 | Status: SHIPPED | OUTPATIENT
Start: 2022-05-04 | End: 2022-06-14 | Stop reason: DRUGHIGH

## 2022-05-04 NOTE — TELEPHONE ENCOUNTER
Patient called for [employer]. Patient said that [employer] placed him on Meloxicam 15 mg medication on 4/19/2022. That he told the patient if it does not work to call and let him know. Patient said he has been taking the medication for about three weeks and the medication is not working at all. Patient is asking for a New medication from [employer]. [facility] [employer] on [address]  In [place]. [phone]. Patient tel. [phone]. Note : patient last seen 4/19/22 for Lumbar Radiculopathy and Left hip by .

## 2022-05-04 NOTE — TELEPHONE ENCOUNTER
I have sent in lyrica 75 mg bid to his pharmacy. This should help with the nerve pain. It is a starting dose so we may have to go up in a couple weeks.

## 2022-05-06 ENCOUNTER — APPOINTMENT (OUTPATIENT)
Dept: PHYSICAL THERAPY | Age: 62
End: 2022-05-06

## 2022-05-09 ENCOUNTER — HOSPITAL ENCOUNTER (OUTPATIENT)
Dept: PHYSICAL THERAPY | Age: 62
End: 2022-05-09

## 2022-05-13 ENCOUNTER — APPOINTMENT (OUTPATIENT)
Dept: PHYSICAL THERAPY | Age: 62
End: 2022-05-13

## 2022-05-16 ENCOUNTER — TELEPHONE (OUTPATIENT)
Dept: ORTHOPEDIC SURGERY | Age: 62
End: 2022-05-16

## 2022-06-03 ENCOUNTER — TELEPHONE (OUTPATIENT)
Dept: ORTHOPEDIC SURGERY | Age: 62
End: 2022-06-03

## 2022-06-03 DIAGNOSIS — M54.16 LUMBAR RADICULOPATHY: ICD-10-CM

## 2022-06-03 RX ORDER — PREGABALIN 150 MG/1
150 CAPSULE ORAL 2 TIMES DAILY
Qty: 180 CAPSULE | Refills: 0 | Status: CANCELLED | OUTPATIENT
Start: 2022-06-03

## 2022-06-03 NOTE — TELEPHONE ENCOUNTER
He no showed his appt with Dr Michelet Miller for his hip and then rescheduled his May appt with Dr Alba Singh to 6/14/22. He can increase the lyrica 75 bid to 150 bid by taking 2 caps bid. He needs to keep his appt 6/14/22 so Dr Alba Singh can go over his MRI with him.

## 2022-06-03 NOTE — TELEPHONE ENCOUNTER
Attempted to contact the pt about his message. He was not able to be reached. A message was left for the pt letting him know that he can increase his lyrica from 75 mg twice a day to 150 mg twice a day. Meaning, he can take 2 caps twice a day instead of one. I advised the pt that he will need to keep his scheduled 06/14/22 appt to be further evaluated and to go over his recent MRI results. The number to the office was provided in case there were any questions or concerns. The pt identification info was used in the message for HIPAA protection.

## 2022-06-03 NOTE — TELEPHONE ENCOUNTER
Patient called stating the medication he was prescribed by Dr. Brianna Beard is not helping with his pain. Patient stated he still cannot put his feet on the ground to walk when he gets up in the morning. Patient could not recall the name of the medication. Patient can be reached at [phone].

## 2022-06-03 NOTE — TELEPHONE ENCOUNTER
Patient is asking if this new dose can be sent to [facility]    Requested Prescriptions     Pending Prescriptions Disp Refills    pregabalin (Lyrica) 150 mg capsule 180 Capsule 0     Sig: Take 1 Capsule by mouth two (2) times a day. Max Daily Amount: 300 mg.            For Dave Randolph in place:    Recommendation Provided To:    Intervention Detail: New Rx: 1, reason: Patient Preference   Gap Closed?:    Intervention Accepted By:   Hilda Jay Time Spent (min): 10

## 2022-06-06 NOTE — TELEPHONE ENCOUNTER
I called and spoke to . Stephen Monk. The pt was identified using 2 pt identifiers. He was advised that we will not be able to send the medication to mail order until he comes for his office visit on 06/14/22. The provider will need to see how he is doing on the increased dose before doing a 90 day supply. He was asked if he has started the increased dose yet. The pt reports that he has not because he is out of medication. A chart review was done and the last fill was for early May and he should be out. I did let the pt know that he does still have a refill left on this medication at his [facility]. He verbalized understanding and will contact them to fill.

## 2022-06-06 NOTE — TELEPHONE ENCOUNTER
Patient left a message checking on the status of this. I attempted to contact the patient back, but had to [facility] for him to return my call.

## 2022-06-14 ENCOUNTER — OFFICE VISIT (OUTPATIENT)
Dept: ORTHOPEDIC SURGERY | Age: 62
End: 2022-06-14
Payer: COMMERCIAL

## 2022-06-14 VITALS
HEIGHT: 75 IN | BODY MASS INDEX: 31.61 KG/M2 | OXYGEN SATURATION: 97 % | TEMPERATURE: 97.8 F | WEIGHT: 254.2 LBS | HEART RATE: 80 BPM

## 2022-06-14 DIAGNOSIS — R93.7 ABNORMAL MRI, LUMBAR SPINE: ICD-10-CM

## 2022-06-14 DIAGNOSIS — M48.062 SPINAL STENOSIS OF LUMBAR REGION WITH NEUROGENIC CLAUDICATION: Primary | ICD-10-CM

## 2022-06-14 PROCEDURE — 99213 OFFICE O/P EST LOW 20 MIN: CPT | Performed by: PHYSICAL MEDICINE & REHABILITATION

## 2022-06-14 RX ORDER — PREDNISOLONE ACETATE 10 MG/ML
SUSPENSION/ DROPS OPHTHALMIC
COMMUNITY
Start: 2022-05-19

## 2022-06-14 RX ORDER — HYDROCHLOROTHIAZIDE 25 MG/1
TABLET ORAL
COMMUNITY
Start: 2022-04-25

## 2022-06-14 RX ORDER — KETOROLAC TROMETHAMINE 5 MG/ML
SOLUTION OPHTHALMIC
COMMUNITY
Start: 2022-06-03

## 2022-06-14 RX ORDER — DICLOFENAC SODIUM 75 MG/1
TABLET, DELAYED RELEASE ORAL
COMMUNITY
Start: 2022-05-27

## 2022-06-14 RX ORDER — PREGABALIN 225 MG/1
225 CAPSULE ORAL 2 TIMES DAILY
Qty: 180 CAPSULE | Refills: 1 | Status: SHIPPED | OUTPATIENT
Start: 2022-06-14

## 2022-06-14 RX ORDER — DICYCLOMINE HYDROCHLORIDE 20 MG/1
TABLET ORAL
COMMUNITY

## 2022-06-14 RX ORDER — ROPINIROLE 4 MG/1
TABLET, FILM COATED ORAL
COMMUNITY

## 2022-06-14 RX ORDER — PREDNISONE 20 MG/1
TABLET ORAL
COMMUNITY
Start: 2022-04-12

## 2022-06-14 RX ORDER — IBUPROFEN 800 MG/1
TABLET ORAL
COMMUNITY

## 2022-06-14 RX ORDER — ONDANSETRON 4 MG/1
TABLET, ORALLY DISINTEGRATING ORAL
COMMUNITY

## 2022-06-14 RX ORDER — MOXIFLOXACIN 5 MG/ML
SOLUTION/ DROPS OPHTHALMIC
COMMUNITY
Start: 2022-05-13

## 2022-06-14 NOTE — LETTER
6/15/2022    Patient: Cinthya Fishman   YOB: 1960   Date of Visit: 6/14/2022     Dawayne Scheuermann, [place] [id number] [address] [address]  Via Fax: [phone]    Dear Dawayne Scheuermann, DO,      Thank you for referring Mr. Gino Rodriguez to [facility] for evaluation. My notes for this consultation are attached. If you have questions, please do not hesitate to call me. I look forward to following your patient along with you.       Sincerely,    Temo Zapata MD

## 2022-06-14 NOTE — PROGRESS NOTES
Aleksandar Paula presents today for   Chief Complaint   Patient presents with    Back Pain       Is someone accompanying this pt? no    Is the patient using any DME equipment during OV? no    Depression Screening:  3 most recent PHQ Screens 10/26/2020   PHQ Not Done Patient Decline   Little interest or pleasure in doing things -   Feeling down, depressed, irritable, or hopeless -   Total Score PHQ 2 -       Learning Assessment:  No flowsheet data found. Abuse Screening:  Abuse Screening Questionnaire 4/19/2022   Do you ever feel afraid of your partner? N   Are you in a relationship with someone who physically or mentally threatens you? N   Is it safe for you to go home? Y       Fall Risk  Fall Risk Assessment, last 12 mths 4/19/2022   Able to walk? Yes   Fall in past 12 months? 0   Do you feel unsteady? 1   Are you worried about falling 1   Number of falls in past 12 months 0       OPIOID RISK TOOL  No flowsheet data found. Coordination of Care:  1. Have you been to the ER, urgent care clinic since your last visit? no  Hospitalized since your last visit? no    2. Have you seen or consulted any other health care providers outside of the [address] [place] [address] since your last visit? Yes eye surgury 05/31/22  Include any pap smears or colon screening.  no

## 2022-06-14 NOTE — PROGRESS NOTES
[address], [address]  [place], [facility]  Phone: [phone]  Fax: [phone]        Darius Bar  : 1960  PCP: Africa Melchor DO  2022    PROGRESS NOTE      HISTORY OF PRESENT ILLNESS  Bernadette Mohs is a 64 y.o. male who was seen as a new patient 2020 with c/o neck pain with paraesthesia radiating into the left shoulder in a C4 distribution x 7 months. Pt denies radiation further into the LUE or hand. He has attended PT with dry needling (19-10/10/19; [place] [facility]) with minimal benefit. Pt notes that he has to sleep in certain positions or it exacerbates his symptoms. He denies limited ROM of his left shoulder. Pt reports some episodes of functional bladder incontinence that he has not yet addressed with his urologist. He currently takes Flomax for his prostate. He denies bowel incontinence or paraesthesia or weakness in his lower extremities. Pt was previously seen 16 as a new patient with c/o lumbar and cervical pain that appeared to be myofascial pain and due to facet syndrome. He had completed several sessions of cervical RFA prior to moving to the area, so he was referred to Dr. Annette Anand to complete the process. He tried lumbar KRAIG and RFA without benefit. He tried Tramadol, Gabapentin, and Baclofen with mild relief. He was also referred to PT and prescribed diclofenac and Tramadol. Cervical spine MRI dated 10/29/19 reviewed. Per report, C3-4: Mild disc bulging and moderate left facet joint arthrosis moderately narrowing the left neural foramen. C4-5: Left disc protrusion and uncovertebral joint hypertrophy narrowing the neural canal and left neural foramen. C5-6: large left disc protrusion and severe left uncovertebral joint hypertrophy narrowing the neural canal and left neural foramen. C6-7: Moderate disc extrusion mildly narrowing the neural canal. He works as a .  He went to see [facility] for cervical KRAIG (Left C5-6 on 3/3/2020 with 85% relief for a few weeks; left C5-6 TFESI with 100% benefit on 4/17/2020until 6/13/2020). He also took Gabapentin and Tramadol. He then had a left cervical MBB with 50% benefit, so he proceeded with the RFA. However, he feels as if after the RFA, he has had pain in the neck and upper back with cervical flexion. His wife has noticed that his head sits lower/more forward than previously. He is s/p ACDF C4-7 (10/12/2020 by Dr. Deloris Clifton). Today, he c/o low back pain radiating into the LLE, progressive x 6 months, especially the last 3. He has difficulty getting out of bed in the morning due to his pain. It sometimes takes a few hours before he feels comfortable putting full weight on his LLE. He is on his third round of Prednisone over the last 3 months. The first one was effective, but the last two have not provided as much relief. He has been in PT (3/28-4/15/22; [facility], [facility] but they discharged him as he had pain with some movements, and they wanted him to be seen by a spine specialist.    Zaira Hutchinson comes in to the office today for f/u. He has low back pain with radiating numbness in the anterior thighs bilaterally and tingling in the posterior BLE. He did not find benefit with Mobic 15 mg daily. Ethel Hernandez NP prescribed Lyrica 75 mg BID then 150 mg BID after he called stating the Mobic was not working. Pt notes that when he was walking with his wife in the store, she noticed he began to drift to one side. He feels occasional heaviness in the legs. Lumbar spine MRI dated 4/30/22 reviewed. Per report, Multilevel degenerative findings of the lumbar spine most notable at L4-5 where there is moderate to severe spinal stenosis with notable contribution from advanced facet arthropathy. This and other levels as detailed above. Possibly a tiny neurogenic tumor along nerve root at the level of L2-3. No regional compromise. Pain Score: 7/10.     PmHx: s/p ACDF C4-7    ASSESSMENT  Hilario Lentz is a 64 y.o. male with c/o low back pain radiating into the BLE, previously LLE. His symptoms are likely due to an atypical presentation of an L4-5 lumbar spinal stenosis with neurogenic claudication. He also has radiologic evidence of a potential tiny neurogenic tumor along the nerve root at L2-3. We discussed options of: neuromodulators, lumbar KRAIG, surgical consult     PLAN  1. Referral to neurosurgery (Dr. Kandace Vicente) for potential tiny neurogenic tumor along nerve root at L2-3  2. Increase Lyrica to 225 mg BID. 3. Referral to Dr. Gardenia Prince for lumbar KRAIG. Pt will f/u after injection with Dr. Gardenia Prince or sooner as needed. Diagnoses and all orders for this visit:    1. Spinal stenosis of lumbar region with neurogenic claudication    2. 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       MEDICATIONS    Current Outpatient Medications   Medication Sig Dispense Refill    diclofenac EC (VOLTAREN) 75 mg EC tablet       dicyclomine (BENTYL) 20 mg tablet dicyclomine 20 mg tablet   take 1 tablet by mouth every 6 hours if needed for ABDOMINAL CRAMPING      empagliflozin (Jardiance) 10 mg tablet Jardiance 10 mg tablet      hydroCHLOROthiazide (HYDRODIURIL) 25 mg tablet       ibuprofen (MOTRIN) 800 mg tablet ibuprofen 800 mg tablet   TAKE 1 TABLET BY MOUTH EVERY 6 TO 8 HOURS AS NEEDED FOR PAIN      ketorolac (ACULAR) 0.5 % ophthalmic solution       moxifloxacin (VIGAMOX) 0.5 % ophthalmic solution       ondansetron (ZOFRAN ODT) 4 mg disintegrating tablet ondansetron 4 mg disintegrating tablet   DISSOLVE 1 TO 2 TABLETS UNDER THE TONGUE 3 TIMES DAILY AS NEEDED FOR NAUSEA      prednisoLONE acetate (PRED FORTE) 1 % ophthalmic suspension       predniSONE (DELTASONE) 20 mg tablet       rOPINIRole (REQUIP) 4 mg tab TAB ropinirole 4 mg tablet   take 1 tablet by mouth at bedtime      pregabalin (Lyrica) 75 mg capsule Take 1 Capsule by mouth two (2) times a day. Max Daily Amount: 150 mg. 60 Capsule 1    meloxicam (MOBIC) 15 mg tablet Take 1 Tablet by mouth daily. 30 Tablet 2    Ozempic 0.25 mg or 0.5 mg(2 mg/1.5 mL) sub-q pen STARING 11/13/20 AND EVERY FRIDAY AFTER THAT inject 0.5 milligrams subcutaneously every week      multivit-min/ferrous fumarate (MULTI VITAMIN PO) Take 1 Gum by mouth daily.  glucose blood VI test strips (BLOOD GLUCOSE TEST) strip Use to test blood sugars  daily as directed      Blood-Glucose Meter (ONETOUCH VERIO IQ METER) monitoring kit Use to test blood sugars twice daily as directed      telmisartan (MICARDIS) 80 mg tablet Take 80 mg by mouth nightly.  finasteride (PROSCAR) 5 mg tablet Take 5 mg by mouth daily.  cyclobenzaprine HCl (FLEXERIL PO) Take 5 mg by mouth three (3) times daily as needed.  glimepiride (AMARYL) 2 mg tablet Take 2 mg by mouth every morning.  amlodipine besylate (AMLODIPINE PO) Take 5 mg by mouth daily.  baclofen (LIORESAL) 10 mg tablet Take 10 mg by mouth nightly.  traMADol (ULTRAM) 50 mg tablet TAKE ONE TABLET BY MOUTH THREE TIMES A DAY AS NEEDED FOR PAIN 90 Tab 0    aspirin 81 mg chewable tablet Take 81 mg by mouth daily.  gabapentin (NEURONTIN) 300 mg capsule Take 300 mg by mouth every six (6) hours. 1    tamsulosin (FLOMAX) 0.4 mg capsule Take 1 Cap by mouth daily (after dinner).  (Patient not taking: Reported on 4/19/2022) 90 Cap 3    tadalafil (CIALIS) 5 mg tablet Take 5 mg by mouth as needed. (Patient not taking: Reported on 4/19/2022)      simvastatin (ZOCOR) 10 mg tablet Take 10 mg by mouth nightly. 1        ALLERGIES  Allergies   Allergen Reactions    Lisinopril Cough          SOCIAL HISTORY    Social History     Socioeconomic History    Marital status:    Tobacco Use    Smoking status: Never Smoker    Smokeless tobacco: Never Used   Vaping Use    Vaping Use: Never used   Substance and Sexual Activity    Alcohol use: No     Alcohol/week: 0.0 standard drinks    Drug use: Never    Sexual activity: Not Currently   Social History Narrative    ** Merged History Encounter **            FAMILY HISTORY  Family History   Problem Relation Age of Onset    Hypertension Mother     Diabetes Father     Heart Failure Father     Hypertension Father          REVIEW OF SYSTEMS  Review of Systems   Constitutional: Negative for chills, fever and weight loss. Respiratory: Negative for shortness of breath. Cardiovascular: Negative for chest pain. Gastrointestinal: Negative for constipation. Negative for fecal incontinence    Genitourinary: Negative for dysuria. Negative for urinary incontinence   Musculoskeletal: Positive for back pain. Skin: Negative for rash. Neurological: Positive for tingling ( BLE). Negative for dizziness, tremors, focal weakness and headaches. Endo/Heme/Allergies: Does not bruise/bleed easily. Psychiatric/Behavioral: The patient does not have insomnia. PHYSICAL EXAMINATION  Visit Vitals  Pulse 80   Temp 97.8 °F (36.6 °C) (Temporal)   Ht 6' 3\" (1.905 m)   Wt 254 lb 3.2 oz (115.3 kg)   SpO2 97%   BMI 31.77 kg/m²       Pain Assessment  6/14/2022   Location of Pain Back   Location Modifiers -   Severity of Pain 7   Quality of Pain Sharp   Quality of Pain Comment -   Duration of Pain Persistent   Frequency of Pain Constant   Aggravating Factors Walking;Standing; Other (Comment) Aggravating Factors Comment laying down   Limiting Behavior Yes   Relieving Factors Other (Comment)   Relieving Factors Comment meds   Result of Injury No           Constitutional:  Well developed, well nourished, in no acute distress. Psychiatric: Affect and mood are appropriate. Integumentary: No rashes or abrasions noted on exposed areas. SPINE/MUSCULOSKELETAL EXAM    Cervical spine:  Neck is midline. Normal muscle tone. No focal atrophy is noted. ROM pain free. Shoulder ROM intact.   No tenderness to palpation. Positive Spurling's sign on the left. Negative Tinel's sign. Negative Connor's sign.                                                                                                                             Sensation in the bilateral arms grossly intact to light touch.     Updates 8/25/2020:  Cervical flexion  Tenderness to palpation of cervical paraspinals      Updates 4/19/22:  Pain with internal rotation of the left hip  Negative log roll test on the left  Tenderness of left buttock  Positive femoral stretch test on the left  Intact sensation in BLE    MOTOR:      Biceps  Triceps Deltoids Wrist Ext Wrist Flex Hand Intrin   Right 5/5 5/5 5/5 5/5 5/5 5/5   Left 5/5 5/5 5/5 5/5 5/5 5/5             Hip Flex  Quads Hamstrings Ankle DF EHL Ankle PF   Right 5/5 5/5 5/5 5/5 5/5 5/5   Left 5/5 5/5 5/5 5/5 5/5 5/5     DTRs are 2+ biceps, triceps, brachioradialis, patella, and Achilles.     Negative Straight Leg raise. Squat not tested. No difficulty with tandem gait.      Ambulation without assistive device. FWB.       RADIOGRAPHS  Lumbar Spine MRI images taken on 4/30/22 personally reviewed with patient:  Alignment: Grade 1 degenerative anterolisthesis of L4. There is also  retrolisthesis of L2 and less so L3. A mild kyphosis centered at L1-2 with disc  space narrowing.   Vertebral body height: Normal  Marrow signal: Unremarkable  Conus: At L1, unremarkable  -More caudad, a less than 2 mm nodular focus along nerve roots at the level of  L2-3 as on sagittal image 9.     Axial imaging correlation:     T12-L1: Disc space narrowing and desiccation. Mild left paracentral disc  protrusion. Patent canal and foramina.     L1-2: Moderately severe disc space narrowing. Disc bulge with some facet  arthropathy. No significant spinal stenosis. Patent foramina.     L2-3: Moderately severe disc space narrowing. Listhesis with broad-based disc  osteophyte complex. Facet arthropathy. There is mild stenosis at the lateral  recesses. No significant central stenosis. There is moderate left foraminal  stenosis.     L3-4: Moderate disc space narrowing. Broad-based disc osteophyte complex. Facet  arthropathy. The canal is patent. Mild foraminal stenosis.     L4-5: Uncovering of the disc. Mild disc bulge. A more focal right foraminal disc  protrusion. There is prominent hypertrophic facet arthropathy. Possibly a  partially desiccated subligamentous synovial cyst protruding into the bony  canal. Resultant moderate to severe spinal stenosis with distortion of thecal  sac. Particular narrowing at the lateral recesses. Axial T2 image 14. Moderate  right and mild left foraminal stenosis.     L5-S1: Broad-based disc bulge. Facet arthropathy. Patent canal and foramina.     Other structures: Unremarkable.        IMPRESSION     1. Multilevel degenerative findings of the lumbar spine  -Most notable at L4-5 where there is moderate to severe spinal stenosis with  notable contribution from advanced facet arthropathy  -This and other levels as detailed above  -Possibly a tiny neurogenic tumor along nerve root at the level of L2-3. No  regional compromise. Lt Hip XR images taken on 4/19/22:  Two views were obtained including frontal view of the pelvis and hips and  frogleg lateral view left hip. There is no acute fracture or dislocation.   Corticated calcific or ossific densities project about the superior acetabulum  laterally, the larger measuring 9 mm possibly loose bodies.     The hip joint spaces are preserved. . No lytic or blastic lesion.     Phleboliths in the pelvis.     IMPRESSION     No acute fracture or dislocation. Small corticated calcific or ossific densities  along the left superior acetabulum laterally. These could reflect loose bodies. Cervical MRI images taken on 10/25/19 personally reviewed with patient:  Alignment: Within normal limits. Vertebral bodies: No compression fractures. Spinal cord: Deformed in shape by degenerative disc disease. No evident intrinsic spinal cord abnormality. Craniocervical junction: Within normal limits. C1-2: Within normal limits. C3-4:Normal disc height. Moderate disc desiccation. Mild disc bulging and bilateral uncovertebral joint hypertrophy. Moderate left facet joint arthrosis and neural foraminal stenosis. Anterior-posterior dimension of the thecal sac at the midline measures 11 mm. C4-5: Moderate disc narrowing and desiccation. Diffuse disc bulging with accompanying osteophytes. Moderate left disc protrusion. Mild-moderate right and moderately severe left uncovertebral joint hypertrophy and neural foraminal stenosis. Anterior-posterior dimension of the thecal sac at the midline measures 9.5 mm. C5-6: Moderate disc narrowing and desiccation. Moderately large left disc protrusion with accompanying osteophytes and severe left uncovertebral joint hypertrophy mild-moderately narrowing the neural canal and severely narrowing the left neural foramen. Moderate right disc protrusion with accompanying osteophytes and moderate right uncovertebral joint hypertrophy. C6-7: Mild disc narrowing and desiccation. Moderate central disc extrusion, eccentric slightly to the right of midline. Mild right uncovertebral joint hypertrophy and neural foraminal stenosis. Anterior-posterior dimension if the thecal sac at the midline measures 9 mm.   Other levels show normal disc height and hydration, no disc protrusion, normal appearance of the facet joints, and no nerve root impingement. Upper thoracic spine: Within normal limits.     IMPRESSION:  C3-4: Mild disc bulging and moderate left facet joint arthrosis moderately narrowing the left neural foramen. C4-5: Left disc protrusion and uncovertebral joint hypertrophy narrowing the neural canal and left neural foramen. C5-6: large left disc protrusion and severe left uncovertebral joint hypertrophy narrowing the neural canal and left neural foramen. C6-7: Moderate disc extrusion mildly narrowing the neural canal.     Cervical XR images taken on 8/16/19 personally reviewed with patient:  AP, lateral,  both oblique, and odontoid views are obtained. Disc  space narrowing is present at the C4-C5, C5-C6 and C6-C7 levels. Discal spurring  is present at all levels. No significant neural foraminal narrowing is seen. There is no fracture or subluxation. Prevertebral soft tissues and predental  space are normal.     IMPRESSION  Impression:      Multilevel degenerative disc disease and osteoarthritic changes. No acute  Abnormalities. 15 minutes of face-to-face contact were spent with the patient during today's visit extensively discussing symptoms and treatment plan. All questions were answered. More than half of this visit today was spent on counseling.      Written by Elsa Iqbal as dictated by Kaylyn Christine MD

## 2022-06-14 NOTE — PATIENT INSTRUCTIONS
Lumbar Spinal Stenosis: Care Instructions  Your Care Instructions     Stenosis in the spine is a narrowing of the canal that is around the spinal cord and nerve roots in your back. It can happen as part of aging. Sometimes bone and other tissue grow into this canal and press on the nerves that branch out from the spinal cord. This can cause pain, numbness, and weakness. When it happens in the lower part of your back, it is called lumbar spinal stenosis. It can cause problems in the legs, feet, and rear end (buttocks). You may be able to get relief from the symptoms of spinal stenosis by taking pain medicine. Your doctor may suggest physical therapy and exercises to keep your spine strong and flexible. Some people try steroid shots to reduce swelling. If pain and numbness in your legs are still so bad that you cannot do your normal activities, you may need surgery. Follow-up care is a key part of your treatment and safety. Be sure to make and go to all appointments, and call your doctor if you are having problems. It's also a good idea to know your test results and keep a list of the medicines you take. How can you care for yourself at home? · Take an over-the-counter pain medicine. Nonsteroidal anti-inflammatory drugs (NSAIDs) such as ibuprofen or naproxen seem to work best. But if you can't take NSAIDs, you can try acetaminophen. Be safe with medicines. Read and follow all instructions on the label. · Do not take two or more pain medicines at the same time unless the doctor told you to. Many pain medicines have acetaminophen, which is Tylenol. Too much acetaminophen (Tylenol) can be harmful. · Stay at a healthy weight. Being overweight puts extra strain on your spine. · Change positions often when you sit or stand. This can ease pain. It may also reduce pressure on the spinal cord and its nerves. · Avoid doing things that make your symptoms worse.  Walking downhill and standing for a long time may cause pain.  · Stretch and strengthen your back muscles as your doctor or physical therapist recommends. If your doctor says it is okay to do them, these exercises may help. ? Lie on your back with your knees bent. Gently pull one bent knee to your chest. Put that foot back on the floor, and then pull the other knee to your chest.  ? Do pelvic tilts. Lie on your back with your knees bent. Tighten your stomach muscles. Pull your belly button (navel) in and up toward your ribs. You should feel like your back is pressing to the floor and your hips and pelvis are slightly lifting off the floor. Hold for 6 seconds while breathing smoothly. ? Stand with your back flat against a wall. Slowly slide down until your knees are slightly bent. Hold for 10 seconds, then slide back up the wall. · Remove or change anything in your house that may cause you to fall. Keep walkways clear of clutter, electrical cords, and throw rugs. When should you call for help? Call 911 anytime you think you may need emergency care. For example, call if:    · You are unable to move a leg at all. Call your doctor now or seek immediate medical care if:    · You have new or worse symptoms in your legs, belly, or buttocks. Symptoms may include:  ? Numbness or tingling. ? Weakness. ? Pain.     · You lose bladder or bowel control. Watch closely for changes in your health, and be sure to contact your doctor if:    · You have a fever, lose weight, or don't feel well.     · You are not getting better as expected. Where can you learn more? Go to http://www.gray.com/  Enter X327 in the search box to learn more about \"Lumbar Spinal Stenosis: Care Instructions. \"  Current as of: July 1, 2021               Content Version: 13.2  © 9516-2372 [employer]. Care instructions adapted under license by [employer] (which disclaims liability or warranty for this information).  If you have questions about a medical condition or this instruction, always ask your healthcare professional. [place] [address] any warranty or liability for your use of this information.

## 2022-06-27 NOTE — PROGRESS NOTES
[address]  [place], [address] [facility], [address] [facility] - Phone: [phone]  Fax: [phone]  DISCHARGE SUMMARY  Patient Name: Nini Delacruz : 1960   Treatment/Medical Diagnosis: Other low back pain [M54.59]   Referral Source: Whit Valiente NP     Date of Initial Visit: 3/28/22 Attended Visits: 2 Missed Visits: 0     SUMMARY OF TREATMENT  Treatment consisting of Miguel approach for LBP, manual therapy and HEP and activity modification for lumbar radiculopathy. CURRENT STATUS  Patient attended initial eval and one follow up treatment and did not return to PT. Patient was advised to follow up with MD due to complaints of severe increase in radiculopathy. Unable toa assess goals upon discharge. Discharge at this time. RECOMMENDATIONS  Discontinue therapy due to lack of appreciable progress towards goals. If you have any questions/comments please contact us directly at [phone]. Thank you for allowing us to assist in the care of your patient.     Therapist Signature: Devan Monreal, TASH Date: 22     Time: 5:47 PM

## 2022-11-05 DIAGNOSIS — M48.062 SPINAL STENOSIS OF LUMBAR REGION WITH NEUROGENIC CLAUDICATION: ICD-10-CM

## 2022-11-07 RX ORDER — PREGABALIN 225 MG/1
CAPSULE ORAL
Qty: 180 CAPSULE | OUTPATIENT
Start: 2022-11-07

## 2022-11-09 NOTE — PROGRESS NOTES
[address]  [address], [address]  [place] [address]  Phone: [phone]  Fax: [phone]       Fab Henderson  : 1960  PCP: Edward Turcios DO  11/10/2022    PROGRESS NOTE    HISTORY OF PRESENT ILLNESS  Jose Jasso is a 58 y.o. male who was seen as a new patient 2020 with c/o neck pain with paraesthesia radiating into the left shoulder in a C4 distribution x 7 months. Pt denies radiation further into the LUE or hand. He has attended PT with dry needling (19-10/10/19; [facility]") with minimal benefit. Pt notes that he has to sleep in certain positions or it exacerbates his symptoms. He denies limited ROM of his left shoulder. Pt reports some episodes of functional bladder incontinence that he has not yet addressed with his urologist. He currently takes Flomax for his prostate. He denies bowel incontinence or paraesthesia or weakness in his lower extremities. Pt was previously seen 16 as a new patient with c/o lumbar and cervical pain that appeared to be myofascial pain and due to facet syndrome. He had completed several sessions of cervical RFA prior to moving to the area, so he was referred to Dr. Kye Villatoro to complete the process. He tried lumbar KRAIG and RFA without benefit. He tried Tramadol, Gabapentin, and Baclofen with mild relief. He was also referred to PT and prescribed diclofenac and Tramadol. Cervical spine MRI dated 10/29/19 reviewed. Per report, C3-4: Mild disc bulging and moderate left facet joint arthrosis moderately narrowing the left neural foramen. C4-5: Left disc protrusion and uncovertebral joint hypertrophy narrowing the neural canal and left neural foramen. C5-6: large left disc protrusion and severe left uncovertebral joint hypertrophy narrowing the neural canal and left neural foramen. C6-7: Moderate disc extrusion mildly narrowing the neural canal. He works as a .  He went to see [facility] for cervical KRAIG (Left C5-6 on 3/3/2020 with 85% relief for a few weeks; left C5-6 TFESI with 100% benefit on 4/17/2020 until 6/13/2020). He also took Gabapentin and Tramadol. He then had a left cervical MBB with 50% benefit, so he proceeded with the RFA. However, he feels as if, after the RFA, he has had pain in the neck and upper back with cervical flexion. His wife has noticed that his head sits lower/more forward than previously. He is s/p ACDF C4-7 (10/12/2020 by Dr. Candace Linda). Today, he c/o low back pain radiating into the LLE, progressive x 6 months, especially the last 3. He has difficulty getting out of bed in the morning due to his pain. It sometimes takes a few hours before he feels comfortable putting full weight on his LLE. He is on his third round of Prednisone over the last 3 months. The first one was effective, but the last two have not provided as much relief. He has been in PT (3/28-4/15/22; [facility], [facility] but they discharged him as he had pain with some movements, and they wanted him to be seen by a spine specialist. During [address] on 6/14/22, he had low back pain with radiating numbness in the anterior thighs bilaterally and tingling in the posterior BLE. He did not find benefit with Mobic 15 mg daily. Earline Graham NP prescribed Lyrica 75 mg BID then 150 mg BID after he called stating the Mobic was not working. Pt notes that when he was walking with his wife in the store, she noticed he began to drift to one side. He feels occasional heaviness in the legs. Lumbar spine MRI dated 4/30/22 reviewed. Per report, Multilevel degenerative findings of the lumbar spine most notable at L4-5 where there is moderate to severe spinal stenosis with notable contribution from advanced facet arthropathy. This and other levels as detailed above. Possibly a tiny neurogenic tumor along nerve root at the level of L2-3. No regional compromise. Dede Espino was seen today for follow up.  He underwent lumbar posterior laminectomy with L4-5 fusion (10/24/22; Dr. Leigh Parker). He notes he is doing well, and currently walks every day. He has some slight pain as he recovers, but feels he is doing well. He continues with Lyrica 225 mg BID. Pain score: 5/10     Treatments patient has tried:  Physical therapy: yes (10/2019; 4/2022)  Doing HEP: Yes  Non-opioid medications: yes - Mobic (no benefit), Lyrica (current), Gabapentin (not taking)  Spinal injections: C5-6 Cervical KRAIG (3/3/2020; with 85% relief), left C5-6 TFESI (4/17/20; benefit),  left cervical MBB, RFA  Spinal surgery- ACDF C4-7 (10/12/2020; Dr. Samantha Martinez), L4-5 lumbar fusion (10/24/22; Dr. Leigh Parker)  Last Lumbar Spine MRI: 2022      PmHx: s/p ACDF C4-7, s/p L4-5 fusion      ASSESSMENT  Yanelis Parsons is a 58 y.o. male with improved lower back pain that used to radiate into BLE. He is recovering from laminectomy and L4-5 lumbar fusion, and currently has some residual pain from surgery. PLAN  Advised pt to continue with medication regimen  Advised to maintain HEP as he recovers from surgery    Pt will f/u in 6 months or sooner if needed. Diagnoses and all orders for this visit:    1. Status post lumbar spinal fusion    2. Lumbar postlaminectomy syndrome    3.  S/P cervical spinal fusion         PAST MEDICAL HISTORY   Past Medical History:   Diagnosis Date    Chronic low back pain     Degeneration of lumbar intervertebral disc     Diabetes ([address]     Essential hypertension     Hyperlipidemia     Hypertension     MRSA colonization 01/23/2020    nasal swab-pt was treated with Mupirocin    Shoulder pain        Past Surgical History:   Procedure Laterality Date    HX ACL RECONSTRUCTION Right     HX CERVICAL FUSION      C5-7    HX COLONOSCOPY      HX HERNIA REPAIR      HX KNEE REPLACEMENT Right     ND PLASTIC SURGERY, NECK      fusion       MEDICATIONS      Current Outpatient Medications   Medication Sig Dispense Refill    senna-docusate (PERICOLACE) 8.6-50 mg per tablet Take 1 Tablet by mouth two (2) times a day. albuterol (PROVENTIL HFA, VENTOLIN HFA, PROAIR HFA) 90 mcg/actuation inhaler albuterol sulfate HFA 90 mcg/actuation aerosol inhaler      colestipoL (COLESTID) 1 gram tablet       diazePAM (VALIUM) 5 mg tablet       FreeStyle Lew 2 Sensor kit See [employer]. hydrOXYzine HCL (ATARAX) 25 mg tablet hydroxyzine HCl 25 mg tablet      insulin glargine (LANTUS) 100 unit/mL injection 30 Units by SubCUTAneous route nightly. Lantus Solostar U-100 Insulin 100 unit/mL (3 mL) inpn       methylPREDNISolone (MEDROL DOSEPACK) 4 mg tablet       naloxone (NARCAN) 4 mg/actuation nasal spray       oxyCODONE-acetaminophen (PERCOCET) 5-325 mg per tablet       pramipexole (MIRAPEX) 1 mg tablet       diclofenac EC (VOLTAREN) 75 mg EC tablet       dicyclomine (BENTYL) 20 mg tablet dicyclomine 20 mg tablet   take 1 tablet by mouth every 6 hours if needed for ABDOMINAL CRAMPING      empagliflozin (Jardiance) 10 mg tablet Jardiance 10 mg tablet      hydroCHLOROthiazide (HYDRODIURIL) 25 mg tablet       ibuprofen (MOTRIN) 800 mg tablet ibuprofen 800 mg tablet   TAKE 1 TABLET BY MOUTH EVERY 6 TO 8 HOURS AS NEEDED FOR PAIN      ketorolac (ACULAR) 0.5 % ophthalmic solution       moxifloxacin (VIGAMOX) 0.5 % ophthalmic solution       ondansetron (ZOFRAN ODT) 4 mg disintegrating tablet ondansetron 4 mg disintegrating tablet   DISSOLVE 1 TO 2 TABLETS UNDER THE TONGUE 3 TIMES DAILY AS NEEDED FOR NAUSEA      prednisoLONE acetate (PRED FORTE) 1 % ophthalmic suspension       predniSONE (DELTASONE) 20 mg tablet       rOPINIRole (REQUIP) 4 mg tab TAB ropinirole 4 mg tablet   take 1 tablet by mouth at bedtime      pregabalin (LYRICA) 225 mg capsule Take 1 Capsule by mouth two (2) times a day. Max Daily Amount: 450 mg. 180 Capsule 1    meloxicam (MOBIC) 15 mg tablet Take 1 Tablet by mouth daily.  30 Tablet 2    Ozempic 0.25 mg or 0.5 mg(2 mg/1.5 mL) sub-q pen STARING 11/13/20 AND EVERY FRIDAY AFTER THAT inject 0.5 milligrams subcutaneously every week      multivit-min/ferrous fumarate (MULTI VITAMIN PO) Take 1 Gum by mouth daily. glucose blood VI test strips (BLOOD GLUCOSE TEST) strip Use to test blood sugars  daily as directed      Blood-Glucose Meter (ONETOUCH VERIO IQ METER) monitoring kit Use to test blood sugars twice daily as directed      telmisartan (MICARDIS) 80 mg tablet Take 80 mg by mouth nightly. finasteride (PROSCAR) 5 mg tablet Take 5 mg by mouth daily. cyclobenzaprine HCl (FLEXERIL PO) Take 5 mg by mouth three (3) times daily as needed. glimepiride (AMARYL) 2 mg tablet Take 2 mg by mouth every morning. amlodipine besylate (AMLODIPINE PO) Take 5 mg by mouth daily. tamsulosin (FLOMAX) 0.4 mg capsule Take 1 Cap by mouth daily (after dinner). (Patient not taking: Reported on 4/19/2022) 90 Cap 3    baclofen (LIORESAL) 10 mg tablet Take 10 mg by mouth nightly. tadalafil (CIALIS) 5 mg tablet Take 5 mg by mouth as needed. (Patient not taking: Reported on 4/19/2022)      traMADol (ULTRAM) 50 mg tablet TAKE ONE TABLET BY MOUTH THREE TIMES A DAY AS NEEDED FOR PAIN 90 Tab 0    aspirin 81 mg chewable tablet Take 81 mg by mouth daily. simvastatin (ZOCOR) 10 mg tablet Take 10 mg by mouth nightly.   1       ALLERGIES    Allergies   Allergen Reactions    Lisinopril Cough          SOCIAL HISTORY    Social History     Socioeconomic History    Marital status:    Tobacco Use    Smoking status: Never    Smokeless tobacco: Never   Vaping Use    Vaping Use: Never used   Substance and Sexual Activity    Alcohol use: No     Alcohol/week: 0.0 standard drinks    Drug use: Never    Sexual activity: Not Currently   Social History Narrative    ** Merged History Encounter **            FAMILY HISTORY    Family History   Problem Relation Age of Onset    Hypertension Mother     Diabetes Father     Heart Failure Father     Hypertension Father        REVIEW OF SYSTEMS  Review of Systems   Constitutional:  Negative for chills, fever and weight loss. Respiratory:  Negative for shortness of breath. Cardiovascular:  Negative for chest pain. Gastrointestinal:  Negative for constipation. Negative for fecal incontinence   Genitourinary:  Negative for dysuria. Negative for urinary incontinence   Skin:  Negative for rash. Neurological:  Negative for dizziness, tingling, tremors, focal weakness and headaches. Endo/Heme/Allergies:  Does not bruise/bleed easily. Psychiatric/Behavioral:  The patient does not have insomnia. PHYSICAL EXAMINATION  Visit Vitals  Pulse (!) 104   Temp 98 °F (36.7 °C) (Temporal)   Resp 20   Ht 5' 9\" (1.753 m)   Wt 250 lb (113.4 kg)   SpO2 97%   BMI 36.92 kg/m²       Pain Assessment  11/10/2022   Location of Pain Back   Location Modifiers -   Severity of Pain -   Quality of Pain -   Quality of Pain Comment -   Duration of Pain -   Frequency of Pain -   Aggravating Factors -   Aggravating Factors Comment -   Limiting Behavior -   Relieving Factors -   Relieving Factors Comment -   Result of Injury -       Constitutional:  Well developed, well nourished, in no acute distress. Psychiatric: Affect and mood are appropriate. HEENT: Normocephalic, atraumatic. Extraocular movements intact. Integumentary: No rashes or abrasions noted on exposed areas. Cardiovascular: Regular rate and rhythm. Pulmonary: Clear to auscultation bilaterally. SPINE/MUSCULOSKELETAL EXAM    Cervical spine:  Neck is midline. Normal muscle tone. No focal atrophy is noted. ROM pain free. Shoulder ROM intact. No tenderness to palpation. Positive Spurling's sign on the left. Negative Tinel's sign. Negative Connor's sign. Sensation in the bilateral arms grossly intact to light touch.      Updates 8/25/2020:  Cervical flexion  Tenderness to palpation of cervical paraspinals      Updates 4/19/22:  Pain with internal rotation of the left hip  Negative log roll test on the left  Tenderness of left buttock  Positive femoral stretch test on the left  Intact sensation in BLE       MOTOR:      Elbow Flex  Elbow Ext Arm Abd Wrist Ext Wrist Flex Hand Intrin   Right 5/5 5/5 5/5 5/5 5/5 5/5   Left 5/5 5/5 5/5 5/5 5/5 5/5             Hip flex  Knee Ext EHL Ankle DF Ankle PF      Right 5/5 5/5 5/5 5/5 5/5    Left 5/5 5/5 5/5 5/5 5/5      DTRs are 2+ biceps, triceps, brachioradialis, patella, and Achilles. Negative Straight Leg raise. Squat not tested. No difficulty with tandem gait. Ambulation without assistive device. FWB. RADIOGRAPHS  Lumbar Spine MRI images taken on 4/30/22 personally reviewed with patient:  Alignment: Grade 1 degenerative anterolisthesis of L4. There is also  retrolisthesis of L2 and less so L3. A mild kyphosis centered at L1-2 with disc  space narrowing. Vertebral body height: Normal  Marrow signal: Unremarkable  Conus: At L1, unremarkable  -More caudad, a less than 2 mm nodular focus along nerve roots at the level of  L2-3 as on sagittal image 9. Axial imaging correlation:     T12-L1: Disc space narrowing and desiccation. Mild left paracentral disc  protrusion. Patent canal and foramina. L1-2: Moderately severe disc space narrowing. Disc bulge with some facet  arthropathy. No significant spinal stenosis. Patent foramina. L2-3: Moderately severe disc space narrowing. Listhesis with broad-based disc  osteophyte complex. Facet arthropathy. There is mild stenosis at the lateral  recesses. No significant central stenosis. There is moderate left foraminal  stenosis. L3-4: Moderate disc space narrowing. Broad-based disc osteophyte complex. Facet  arthropathy. The canal is patent. Mild foraminal stenosis. L4-5: Uncovering of the disc. Mild disc bulge.  A more focal right foraminal disc  protrusion. There is prominent hypertrophic facet arthropathy. Possibly a  partially desiccated subligamentous synovial cyst protruding into the bony  canal. Resultant moderate to severe spinal stenosis with distortion of thecal  sac. Particular narrowing at the lateral recesses. Axial T2 image 14. Moderate  right and mild left foraminal stenosis. L5-S1: Broad-based disc bulge. Facet arthropathy. Patent canal and foramina. Other structures: Unremarkable. IMPRESSION     1. Multilevel degenerative findings of the lumbar spine  -Most notable at L4-5 where there is moderate to severe spinal stenosis with  notable contribution from advanced facet arthropathy  -This and other levels as detailed above  -Possibly a tiny neurogenic tumor along nerve root at the level of L2-3. No  regional compromise. Lt Hip XR images taken on 4/19/22:  Two views were obtained including frontal view of the pelvis and hips and  frogleg lateral view left hip. There is no acute fracture or dislocation. Corticated calcific or ossific densities project about the superior acetabulum  laterally, the larger measuring 9 mm possibly loose bodies. The hip joint spaces are preserved. . No lytic or blastic lesion. Phleboliths in the pelvis. IMPRESSION     No acute fracture or dislocation. Small corticated calcific or ossific densities  along the left superior acetabulum laterally. These could reflect loose bodies. Cervical MRI images taken on 10/25/19 personally reviewed with patient:  Alignment: Within normal limits. Vertebral bodies: No compression fractures. Spinal cord: Deformed in shape by degenerative disc disease. No evident intrinsic spinal cord abnormality. Craniocervical junction: Within normal limits. C1-2: Within normal limits. C3-4:Normal disc height. Moderate disc desiccation. Mild disc bulging and bilateral uncovertebral joint hypertrophy.  Moderate left facet joint arthrosis and neural foraminal stenosis. Anterior-posterior dimension of the thecal sac at the midline measures 11 mm. C4-5: Moderate disc narrowing and desiccation. Diffuse disc bulging with accompanying osteophytes. Moderate left disc protrusion. Mild-moderate right and moderately severe left uncovertebral joint hypertrophy and neural foraminal stenosis. Anterior-posterior dimension of the thecal sac at the midline measures 9.5 mm. C5-6: Moderate disc narrowing and desiccation. Moderately large left disc protrusion with accompanying osteophytes and severe left uncovertebral joint hypertrophy mild-moderately narrowing the neural canal and severely narrowing the left neural foramen. Moderate right disc protrusion with accompanying osteophytes and moderate right uncovertebral joint hypertrophy. C6-7: Mild disc narrowing and desiccation. Moderate central disc extrusion, eccentric slightly to the right of midline. Mild right uncovertebral joint hypertrophy and neural foraminal stenosis. Anterior-posterior dimension if the thecal sac at the midline measures 9 mm. Other levels show normal disc height and hydration, no disc protrusion, normal appearance of the facet joints, and no nerve root impingement. Upper thoracic spine: Within normal limits. IMPRESSION:  C3-4: Mild disc bulging and moderate left facet joint arthrosis moderately narrowing the left neural foramen. C4-5: Left disc protrusion and uncovertebral joint hypertrophy narrowing the neural canal and left neural foramen. C5-6: large left disc protrusion and severe left uncovertebral joint hypertrophy narrowing the neural canal and left neural foramen. C6-7: Moderate disc extrusion mildly narrowing the neural canal.     Cervical XR images taken on 8/16/19 personally reviewed with patient:  AP, lateral,  both oblique, and odontoid views are obtained. Disc  space narrowing is present at the C4-C5, C5-C6 and C6-C7 levels. Discal spurring  is present at all levels.  No significant neural foraminal narrowing is seen. There is no fracture or subluxation. Prevertebral soft tissues and predental  space are normal.     IMPRESSION  Impression:      Multilevel degenerative disc disease and osteoarthritic changes. No acute  Abnormalities. 8 minutes of face-to-face contact were spent with the patient during today's visit extensively discussing symptoms and treatment plan. All questions were answered. More than half of this visit today was spent on counseling. Written by Anthony Aguilar, as dictated by Dr. Kary Ridley.

## 2022-11-10 ENCOUNTER — OFFICE VISIT (OUTPATIENT)
Dept: ORTHOPEDIC SURGERY | Age: 62
End: 2022-11-10
Payer: COMMERCIAL

## 2022-11-10 VITALS
TEMPERATURE: 98 F | HEART RATE: 104 BPM | BODY MASS INDEX: 37.03 KG/M2 | RESPIRATION RATE: 20 BRPM | HEIGHT: 69 IN | OXYGEN SATURATION: 97 % | WEIGHT: 250 LBS

## 2022-11-10 DIAGNOSIS — Z98.1 S/P CERVICAL SPINAL FUSION: ICD-10-CM

## 2022-11-10 DIAGNOSIS — M96.1 LUMBAR POSTLAMINECTOMY SYNDROME: ICD-10-CM

## 2022-11-10 DIAGNOSIS — Z98.1 STATUS POST LUMBAR SPINAL FUSION: Primary | ICD-10-CM

## 2022-11-10 PROCEDURE — 99213 OFFICE O/P EST LOW 20 MIN: CPT | Performed by: PHYSICAL MEDICINE & REHABILITATION

## 2022-11-10 RX ORDER — DIAZEPAM 5 MG/1
TABLET ORAL
COMMUNITY
Start: 2022-11-08

## 2022-11-10 RX ORDER — HYDROXYZINE 25 MG/1
TABLET, FILM COATED ORAL
COMMUNITY

## 2022-11-10 RX ORDER — AMOXICILLIN 250 MG
1 CAPSULE ORAL 2 TIMES DAILY
COMMUNITY
Start: 2022-10-26

## 2022-11-10 RX ORDER — ALBUTEROL SULFATE 90 UG/1
AEROSOL, METERED RESPIRATORY (INHALATION)
COMMUNITY

## 2022-11-10 RX ORDER — PRAMIPEXOLE DIHYDROCHLORIDE 1 MG/1
TABLET ORAL
COMMUNITY
Start: 2022-10-19

## 2022-11-10 RX ORDER — MONTELUKAST SODIUM 4 MG/1
TABLET, CHEWABLE ORAL
COMMUNITY
Start: 2022-09-07

## 2022-11-10 RX ORDER — METHYLPREDNISOLONE 4 MG/1
TABLET ORAL
COMMUNITY
Start: 2022-08-09

## 2022-11-10 RX ORDER — INSULIN GLARGINE 100 [IU]/ML
INJECTION, SOLUTION SUBCUTANEOUS
COMMUNITY
Start: 2022-11-02

## 2022-11-10 RX ORDER — OXYCODONE AND ACETAMINOPHEN 5; 325 MG/1; MG/1
TABLET ORAL
COMMUNITY
Start: 2022-10-26

## 2022-11-10 RX ORDER — INSULIN GLARGINE 100 [IU]/ML
30 INJECTION, SOLUTION SUBCUTANEOUS
COMMUNITY

## 2022-11-10 RX ORDER — FLASH GLUCOSE SENSOR
KIT MISCELLANEOUS SEE ADMIN INSTRUCTIONS
COMMUNITY
Start: 2022-11-01

## 2022-11-10 RX ORDER — NALOXONE HYDROCHLORIDE 4 MG/.1ML
SPRAY NASAL
COMMUNITY
Start: 2022-10-26

## 2023-01-03 ENCOUNTER — HOSPITAL ENCOUNTER (OUTPATIENT)
Dept: LAB | Age: 63
Discharge: HOME | End: 2023-01-03
Payer: COMMERCIAL

## 2023-01-03 LAB
ERYTHROCYTE [DISTWIDTH] IN BLOOD BY AUTOMATED COUNT: 12.2 % (ref 11.6–14.5)
HCT VFR BLD AUTO: 47.2 % (ref 36–48)
HGB BLD-MCNC: 16.4 G/DL (ref 13–16)
MCH RBC QN AUTO: 32.7 PG (ref 24–34)
MCHC RBC AUTO-ENTMCNC: 34.7 G/DL (ref 31–37)
MCV RBC AUTO: 94 FL (ref 78–100)
NRBC # BLD: 0 K/UL (ref 0–0.01)
NRBC BLD-RTO: 0 PER 100 WBC
PLATELET # BLD AUTO: 251 K/UL (ref 135–420)
PMV BLD AUTO: 10.7 FL (ref 9.2–11.8)
RBC # BLD AUTO: 5.02 M/UL (ref 4.35–5.65)
WBC # BLD AUTO: 9 K/UL (ref 4.6–13.2)

## 2023-01-03 PROCEDURE — 85027 COMPLETE CBC AUTOMATED: CPT

## 2023-01-03 PROCEDURE — 36415 COLL VENOUS BLD VENIPUNCTURE: CPT

## 2023-06-07 ENCOUNTER — HOSPITAL ENCOUNTER (OUTPATIENT)
Facility: HOSPITAL | Age: 63
Discharge: HOME | End: 2023-06-10
Payer: COMMERCIAL

## 2023-06-07 LAB
ALBUMIN SERPL-MCNC: 4.2 G/DL (ref 3.4–5)
ALBUMIN/GLOB SERPL: 1.3 (ref 0.8–1.7)
ALP SERPL-CCNC: 102 U/L (ref 45–117)
ALT SERPL-CCNC: 48 U/L (ref 16–61)
ANION GAP SERPL CALC-SCNC: 13 MMOL/L (ref 3–18)
AST SERPL-CCNC: 24 U/L (ref 10–38)
BASOPHILS # BLD: 0 K/UL (ref 0–0.1)
BASOPHILS NFR BLD: 0 % (ref 0–2)
BILIRUB SERPL-MCNC: 2 MG/DL (ref 0.2–1)
BUN SERPL-MCNC: 29 MG/DL (ref 7–18)
BUN/CREAT SERPL: 17 (ref 12–20)
CALCIUM SERPL-MCNC: 9.7 MG/DL (ref 8.5–10.1)
CHLORIDE SERPL-SCNC: 93 MMOL/L (ref 100–111)
CHOLEST SERPL-MCNC: 180 MG/DL
CO2 SERPL-SCNC: 23 MMOL/L (ref 21–32)
CREAT SERPL-MCNC: 1.73 MG/DL (ref 0.6–1.3)
CREAT UR-MCNC: 16 MG/DL (ref 30–125)
DIFFERENTIAL METHOD BLD: ABNORMAL
EOSINOPHIL # BLD: 0 K/UL (ref 0–0.4)
EOSINOPHIL NFR BLD: 0 % (ref 0–5)
ERYTHROCYTE [DISTWIDTH] IN BLOOD BY AUTOMATED COUNT: 12.3 % (ref 11.6–14.5)
GLOBULIN SER CALC-MCNC: 3.3 G/DL (ref 2–4)
GLUCOSE SERPL-MCNC: 744 MG/DL (ref 74–99)
HBA1C MFR BLD: 9.8 % (ref 4.2–5.6)
HCT VFR BLD AUTO: 44.2 % (ref 36–48)
HDLC SERPL-MCNC: 57 MG/DL (ref 40–60)
HDLC SERPL: 3.2 (ref 0–5)
HGB BLD-MCNC: 15.6 G/DL (ref 13–16)
IMM GRANULOCYTES # BLD AUTO: 0.1 K/UL (ref 0–0.04)
IMM GRANULOCYTES NFR BLD AUTO: 1 % (ref 0–0.5)
LDLC SERPL CALC-MCNC: 60.8 MG/DL (ref 0–100)
LIPID PANEL: ABNORMAL
LYMPHOCYTES # BLD: 0.4 K/UL (ref 0.9–3.6)
LYMPHOCYTES NFR BLD: 5 % (ref 21–52)
MCH RBC QN AUTO: 34.2 PG (ref 24–34)
MCHC RBC AUTO-ENTMCNC: 35.3 G/DL (ref 31–37)
MCV RBC AUTO: 96.9 FL (ref 78–100)
MICROALBUMIN UR-MCNC: 5.28 MG/DL (ref 0–3)
MICROALBUMIN/CREAT UR-RTO: 330 MG/G (ref 0–30)
MONOCYTES # BLD: 0.1 K/UL (ref 0.05–1.2)
MONOCYTES NFR BLD: 1 % (ref 3–10)
NEUTS SEG # BLD: 8.7 K/UL (ref 1.8–8)
NEUTS SEG NFR BLD: 93 % (ref 40–73)
NRBC # BLD: 0 K/UL (ref 0–0.01)
NRBC BLD-RTO: 0 PER 100 WBC
PLATELET # BLD AUTO: 260 K/UL (ref 135–420)
PMV BLD AUTO: 10.9 FL (ref 9.2–11.8)
POTASSIUM SERPL-SCNC: 4 MMOL/L (ref 3.5–5.5)
PROT SERPL-MCNC: 7.5 G/DL (ref 6.4–8.2)
RBC # BLD AUTO: 4.56 M/UL (ref 4.35–5.65)
SODIUM SERPL-SCNC: 129 MMOL/L (ref 136–145)
TRIGL SERPL-MCNC: 311 MG/DL
VLDLC SERPL CALC-MCNC: 62.2 MG/DL
WBC # BLD AUTO: 9.4 K/UL (ref 4.6–13.2)

## 2023-06-07 PROCEDURE — 82570 ASSAY OF URINE CREATININE: CPT

## 2023-06-07 PROCEDURE — 82043 UR ALBUMIN QUANTITATIVE: CPT

## 2023-06-07 PROCEDURE — 36415 COLL VENOUS BLD VENIPUNCTURE: CPT

## 2023-06-07 PROCEDURE — 80053 COMPREHEN METABOLIC PANEL: CPT

## 2023-06-07 PROCEDURE — 85025 COMPLETE CBC W/AUTO DIFF WBC: CPT

## 2023-06-07 PROCEDURE — 80061 LIPID PANEL: CPT

## 2023-06-07 PROCEDURE — 83036 HEMOGLOBIN GLYCOSYLATED A1C: CPT

## 2023-07-17 ENCOUNTER — HOSPITAL ENCOUNTER (OUTPATIENT)
Facility: HOSPITAL | Age: 63
Discharge: HOME | End: 2023-07-20
Payer: COMMERCIAL

## 2023-07-17 LAB
EST. AVERAGE GLUCOSE BLD GHB EST-MCNC: 200 MG/DL
HBA1C MFR BLD: 8.6 % (ref 4.2–5.6)

## 2023-07-17 PROCEDURE — 83036 HEMOGLOBIN GLYCOSYLATED A1C: CPT

## 2023-07-17 PROCEDURE — 36415 COLL VENOUS BLD VENIPUNCTURE: CPT

## 2024-11-05 ENCOUNTER — HOSPITAL ENCOUNTER (OUTPATIENT)
Facility: HOSPITAL | Age: 64
Discharge: HOME | End: 2024-11-08
Payer: COMMERCIAL

## 2024-11-05 LAB
ALBUMIN SERPL-MCNC: 4.3 G/DL (ref 3.4–5)
ALBUMIN/GLOB SERPL: 1.3 (ref 0.8–1.7)
ALP SERPL-CCNC: 84 U/L (ref 45–117)
ALT SERPL-CCNC: 29 U/L (ref 16–61)
ANION GAP SERPL CALC-SCNC: 8 MMOL/L (ref 3–18)
AST SERPL-CCNC: 26 U/L (ref 10–38)
BASOPHILS # BLD: 0.1 K/UL (ref 0–0.1)
BASOPHILS NFR BLD: 1 % (ref 0–2)
BILIRUB SERPL-MCNC: 1.9 MG/DL (ref 0.2–1)
BUN SERPL-MCNC: 24 MG/DL (ref 7–18)
BUN/CREAT SERPL: 13 (ref 12–20)
CALCIUM SERPL-MCNC: 9.8 MG/DL (ref 8.5–10.1)
CHLORIDE SERPL-SCNC: 101 MMOL/L (ref 100–111)
CHOLEST SERPL-MCNC: 112 MG/DL
CO2 SERPL-SCNC: 28 MMOL/L (ref 21–32)
CREAT SERPL-MCNC: 1.83 MG/DL (ref 0.6–1.3)
CREAT UR-MCNC: 123 MG/DL (ref 30–125)
DIFFERENTIAL METHOD BLD: ABNORMAL
EOSINOPHIL # BLD: 0.2 K/UL (ref 0–0.4)
EOSINOPHIL NFR BLD: 2 % (ref 0–5)
ERYTHROCYTE [DISTWIDTH] IN BLOOD BY AUTOMATED COUNT: 12.4 % (ref 11.6–14.5)
GLOBULIN SER CALC-MCNC: 3.4 G/DL (ref 2–4)
GLUCOSE SERPL-MCNC: 97 MG/DL (ref 74–99)
HBA1C MFR BLD: 5.8 % (ref 4.2–5.6)
HCT VFR BLD AUTO: 46 % (ref 36–48)
HDLC SERPL-MCNC: 50 MG/DL (ref 40–60)
HDLC SERPL: 2.2 (ref 0–5)
HGB BLD-MCNC: 15.3 G/DL (ref 13–16)
IMM GRANULOCYTES # BLD AUTO: 0 K/UL (ref 0–0.04)
IMM GRANULOCYTES NFR BLD AUTO: 0 % (ref 0–0.5)
LDLC SERPL CALC-MCNC: 27.8 MG/DL (ref 0–100)
LIPID PANEL: ABNORMAL
LYMPHOCYTES # BLD: 1.5 K/UL (ref 0.9–3.6)
LYMPHOCYTES NFR BLD: 16 % (ref 21–52)
MCH RBC QN AUTO: 33.6 PG (ref 24–34)
MCHC RBC AUTO-ENTMCNC: 33.3 G/DL (ref 31–37)
MCV RBC AUTO: 101.1 FL (ref 78–100)
MICROALBUMIN UR-MCNC: 4.09 MG/DL (ref 0–3)
MICROALBUMIN/CREAT UR-RTO: 33 MG/G (ref 0–30)
MONOCYTES # BLD: 0.6 K/UL (ref 0.05–1.2)
MONOCYTES NFR BLD: 7 % (ref 3–10)
NEUTS SEG # BLD: 7 K/UL (ref 1.8–8)
NEUTS SEG NFR BLD: 75 % (ref 40–73)
NRBC # BLD: 0 K/UL (ref 0–0.01)
NRBC BLD-RTO: 0 PER 100 WBC
PLATELET # BLD AUTO: 291 K/UL (ref 135–420)
PMV BLD AUTO: 10.4 FL (ref 9.2–11.8)
POTASSIUM SERPL-SCNC: 3.8 MMOL/L (ref 3.5–5.5)
PROT SERPL-MCNC: 7.7 G/DL (ref 6.4–8.2)
RBC # BLD AUTO: 4.55 M/UL (ref 4.35–5.65)
SODIUM SERPL-SCNC: 137 MMOL/L (ref 136–145)
TRIGL SERPL-MCNC: 171 MG/DL
VLDLC SERPL CALC-MCNC: 34.2 MG/DL
WBC # BLD AUTO: 9.3 K/UL (ref 4.6–13.2)

## 2024-11-05 PROCEDURE — 84154 ASSAY OF PSA FREE: CPT

## 2024-11-05 PROCEDURE — 80061 LIPID PANEL: CPT

## 2024-11-05 PROCEDURE — 82570 ASSAY OF URINE CREATININE: CPT

## 2024-11-05 PROCEDURE — 84153 ASSAY OF PSA TOTAL: CPT

## 2024-11-05 PROCEDURE — 83036 HEMOGLOBIN GLYCOSYLATED A1C: CPT

## 2024-11-05 PROCEDURE — 80053 COMPREHEN METABOLIC PANEL: CPT

## 2024-11-05 PROCEDURE — 85025 COMPLETE CBC W/AUTO DIFF WBC: CPT

## 2024-11-05 PROCEDURE — 82043 UR ALBUMIN QUANTITATIVE: CPT

## 2024-11-05 PROCEDURE — 36415 COLL VENOUS BLD VENIPUNCTURE: CPT

## 2024-11-06 LAB
PSA FREE MFR SERPL: 44.1 %
PSA FREE SERPL-MCNC: 0.75 NG/ML
PSA SERPL-MCNC: 1.7 NG/ML (ref 0–4)

## (undated) DEVICE — SCREW EXT FIX L14MM FOR DISTRCTN

## (undated) DEVICE — INTENDED FOR TISSUE SEPARATION, AND OTHER PROCEDURES THAT REQUIRE A SHARP SURGICAL BLADE TO PUNCTURE OR CUT.: Brand: BARD-PARKER SAFETY BLADES SIZE 10, STERILE

## (undated) DEVICE — SYR 50ML SLIP TIP NSAF LF STRL --

## (undated) DEVICE — ENDOSCOPY PUMP TUBING/ CAP SET: Brand: ERBE

## (undated) DEVICE — Device

## (undated) DEVICE — CATHETER SUCT TR FL TIP 14FR W/ O CTRL

## (undated) DEVICE — 10FR FRAZIER SUCTION HANDLE: Brand: CARDINAL HEALTH

## (undated) DEVICE — REM POLYHESIVE ADULT PATIENT RETURN ELECTRODE: Brand: VALLEYLAB

## (undated) DEVICE — AIRLIFE™ NASAL OXYGEN CANNULA CURVED, FLARED TIP WITH 14 FOOT (4.3 M) CRUSH-RESISTANT TUBING, OVER-THE-EAR STYLE: Brand: AIRLIFE™

## (undated) DEVICE — BASIN EMESIS 500CC ROSE 250/CS 60/PLT: Brand: MEDEGEN MEDICAL PRODUCTS, LLC

## (undated) DEVICE — PACKING 8004000 NEURAY 200PK 13X13MM: Brand: NEURAY ®

## (undated) DEVICE — PIN SAFETY 2

## (undated) DEVICE — MEDI-VAC SUCTION HIGH CAPACITY: Brand: CARDINAL HEALTH

## (undated) DEVICE — COLLAR CERV L H3.25X23IN M DENS FOAM COT STOCK CVR LO

## (undated) DEVICE — FLEX ADVANTAGE 3000CC: Brand: FLEX ADVANTAGE

## (undated) DEVICE — SPONGE DISSECT PNUT SM 3/8IN -- 5/PK

## (undated) DEVICE — 3.0MM PRECISION NEURO (MATCH HEAD)

## (undated) DEVICE — SOLUTION IRRIG 1000ML H2O STRL BLT

## (undated) DEVICE — MEDI-VAC NON-CONDUCTIVE SUCTION TUBING: Brand: CARDINAL HEALTH

## (undated) DEVICE — SYRINGE MED 25GA 3ML L5/8IN SUBQ PLAS W/ DETACH NDL SFTY

## (undated) DEVICE — STOCKING COMPR XL L16-18IN LNG 19MMHG ANK 10-11IN CALF

## (undated) DEVICE — DRAIN SURG W7MMXL20CM SIL FULL PERF HUBLESS FLAT RADPQ STRP

## (undated) DEVICE — SKIN CLOS DERMABND PRINEO 60CM -- DERMABOUND PRINEO

## (undated) DEVICE — INSULATED BLADE ELECTRODE: Brand: EDGE

## (undated) DEVICE — APPLIER CLP L9.38IN M LIG TI DISP STR RNG HNDL LIGACLP

## (undated) DEVICE — GARMENT,MEDLINE,DVT,INT,CALF,FOAM,MED: Brand: MEDLINE

## (undated) DEVICE — PREP CHLORAPREP 10.5 ML ORG --

## (undated) DEVICE — BITE BLOCK ENDOSCP UNIV AD 6 TO 9.4 MM

## (undated) DEVICE — SUTURE VCRL SZ 3-0 L27IN ABSRB UD L26MM SH 1/2 CIR J416H

## (undated) DEVICE — FLUFF AND POLYMER UNDERPAD,EXTRA HEAVY: Brand: WINGS

## (undated) DEVICE — SYRINGE MED 3ML NDL 22GA L1 1/2IN REG BVL SFGLDE

## (undated) DEVICE — STERILE POLYISOPRENE POWDER-FREE SURGICAL GLOVES: Brand: PROTEXIS

## (undated) DEVICE — OPTIFOAM GENTLE SA, POSTOP, 4X8: Brand: MEDLINE

## (undated) DEVICE — GAUZE,SPONGE,4"X4",16PLY,STRL,LF,10/TRAY: Brand: MEDLINE

## (undated) DEVICE — KIT CLN UP BON SECOURS MARYV

## (undated) DEVICE — 3M™ TEGADERM™ TRANSPARENT FILM DRESSING FRAME STYLE, 1626W, 4 IN X 4-3/4 IN (10 CM X 12 CM), 50/CT 4CT/CASE: Brand: 3M™ TEGADERM™

## (undated) DEVICE — SSC BONE WAX: Brand: SSC BONE WAX